# Patient Record
Sex: MALE | Race: WHITE | NOT HISPANIC OR LATINO | Employment: OTHER | URBAN - METROPOLITAN AREA
[De-identification: names, ages, dates, MRNs, and addresses within clinical notes are randomized per-mention and may not be internally consistent; named-entity substitution may affect disease eponyms.]

---

## 2022-06-03 ENCOUNTER — HOSPITAL ENCOUNTER (INPATIENT)
Facility: HOSPITAL | Age: 77
LOS: 4 days | Discharge: NURSING, REHAB OR LONG-TERM CARE | DRG: 563 | End: 2022-06-07
Attending: SURGERY | Admitting: SURGERY
Payer: MEDICARE

## 2022-06-03 ENCOUNTER — APPOINTMENT (INPATIENT)
Dept: RADIOLOGY | Facility: HOSPITAL | Age: 77
DRG: 563 | End: 2022-06-03
Payer: MEDICARE

## 2022-06-03 ENCOUNTER — APPOINTMENT (EMERGENCY)
Dept: CT IMAGING | Facility: HOSPITAL | Age: 77
DRG: 563 | End: 2022-06-03
Payer: MEDICARE

## 2022-06-03 DIAGNOSIS — E87.1 HYPONATREMIA: ICD-10-CM
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
## 2022-06-03 DIAGNOSIS — S42.352A CLOSED DISPLACED COMMINUTED FRACTURE OF SHAFT OF LEFT HUMERUS, INITIAL ENCOUNTER: ICD-10-CM

## 2022-06-03 PROBLEM — R26.2 AMBULATORY DYSFUNCTION: Chronic | Status: ACTIVE | Noted: 2022-06-03

## 2022-06-03 PROBLEM — I47.20 V-TACH: Chronic | Status: ACTIVE | Noted: 2022-06-03

## 2022-06-03 PROBLEM — I10 HYPERTENSION: Chronic | Status: ACTIVE | Noted: 2022-06-03

## 2022-06-03 PROBLEM — S42.302A CLOSED FRACTURE OF SHAFT OF LEFT HUMERUS: Status: ACTIVE | Noted: 2022-06-03

## 2022-06-03 PROBLEM — E78.5 HYPERLIPIDEMIA: Chronic | Status: ACTIVE | Noted: 2022-06-03

## 2022-06-03 PROBLEM — I47.2 V-TACH (HCC): Chronic | Status: ACTIVE | Noted: 2022-06-03

## 2022-06-03 PROBLEM — N18.9 CKD (CHRONIC KIDNEY DISEASE): Chronic | Status: ACTIVE | Noted: 2022-06-03

## 2022-06-03 LAB
ABO GROUP BLD: NORMAL
ALBUMIN SERPL BCP-MCNC: 3.4 G/DL (ref 3.5–5)
ALP SERPL-CCNC: 102 U/L (ref 34–104)
ALT SERPL W P-5'-P-CCNC: 82 U/L (ref 7–52)
ANION GAP SERPL CALCULATED.3IONS-SCNC: 12 MMOL/L (ref 4–13)
APTT PPP: 28 SECONDS (ref 23–37)
AST SERPL W P-5'-P-CCNC: 60 U/L (ref 13–39)
BASOPHILS # BLD AUTO: 0.01 THOUSANDS/ΜL (ref 0–0.1)
BASOPHILS NFR BLD AUTO: 0 % (ref 0–1)
BILIRUB SERPL-MCNC: 1.14 MG/DL (ref 0.2–1)
BLD GP AB SCN SERPL QL: NEGATIVE
BUN SERPL-MCNC: 30 MG/DL (ref 5–25)
CALCIUM ALBUM COR SERPL-MCNC: 9.6 MG/DL (ref 8.3–10.1)
CALCIUM SERPL-MCNC: 9.1 MG/DL (ref 8.4–10.2)
CHLORIDE SERPL-SCNC: 94 MMOL/L (ref 96–108)
CO2 SERPL-SCNC: 31 MMOL/L (ref 21–32)
CREAT SERPL-MCNC: 1.76 MG/DL (ref 0.6–1.3)
EOSINOPHIL # BLD AUTO: 0.03 THOUSAND/ΜL (ref 0–0.61)
EOSINOPHIL NFR BLD AUTO: 0 % (ref 0–6)
ERYTHROCYTE [DISTWIDTH] IN BLOOD BY AUTOMATED COUNT: 15 % (ref 11.6–15.1)
GFR SERPL CREATININE-BSD FRML MDRD: 37 ML/MIN/1.73SQ M
GLUCOSE SERPL-MCNC: 81 MG/DL (ref 65–140)
HCT VFR BLD AUTO: 39.2 % (ref 36.5–49.3)
HGB BLD-MCNC: 12.7 G/DL (ref 12–17)
IMM GRANULOCYTES # BLD AUTO: 0.05 THOUSAND/UL (ref 0–0.2)
IMM GRANULOCYTES NFR BLD AUTO: 1 % (ref 0–2)
INR PPP: 1.14 (ref 0.84–1.19)
LYMPHOCYTES # BLD AUTO: 0.92 THOUSANDS/ΜL (ref 0.6–4.47)
LYMPHOCYTES NFR BLD AUTO: 12 % (ref 14–44)
MCH RBC QN AUTO: 29.8 PG (ref 26.8–34.3)
MCHC RBC AUTO-ENTMCNC: 32.4 G/DL (ref 31.4–37.4)
MCV RBC AUTO: 92 FL (ref 82–98)
MONOCYTES # BLD AUTO: 0.98 THOUSAND/ΜL (ref 0.17–1.22)
MONOCYTES NFR BLD AUTO: 13 % (ref 4–12)
NEUTROPHILS # BLD AUTO: 5.49 THOUSANDS/ΜL (ref 1.85–7.62)
NEUTS SEG NFR BLD AUTO: 74 % (ref 43–75)
NRBC BLD AUTO-RTO: 0 /100 WBCS
PLATELET # BLD AUTO: 215 THOUSANDS/UL (ref 149–390)
PMV BLD AUTO: 10.8 FL (ref 8.9–12.7)
POTASSIUM SERPL-SCNC: 4.1 MMOL/L (ref 3.5–5.3)
PROT SERPL-MCNC: 7.1 G/DL (ref 6.4–8.4)
PROTHROMBIN TIME: 14.6 SECONDS (ref 11.6–14.5)
RBC # BLD AUTO: 4.26 MILLION/UL (ref 3.88–5.62)
RH BLD: POSITIVE
SODIUM SERPL-SCNC: 137 MMOL/L (ref 135–147)
SPECIMEN EXPIRATION DATE: NORMAL
WBC # BLD AUTO: 7.48 THOUSAND/UL (ref 4.31–10.16)

## 2022-06-03 PROCEDURE — 93005 ELECTROCARDIOGRAM TRACING: CPT

## 2022-06-03 PROCEDURE — 73200 CT UPPER EXTREMITY W/O DYE: CPT

## 2022-06-03 PROCEDURE — 85610 PROTHROMBIN TIME: CPT | Performed by: STUDENT IN AN ORGANIZED HEALTH CARE EDUCATION/TRAINING PROGRAM

## 2022-06-03 PROCEDURE — 93308 TTE F-UP OR LMTD: CPT | Performed by: SURGERY

## 2022-06-03 PROCEDURE — 85730 THROMBOPLASTIN TIME PARTIAL: CPT | Performed by: STUDENT IN AN ORGANIZED HEALTH CARE EDUCATION/TRAINING PROGRAM

## 2022-06-03 PROCEDURE — 70450 CT HEAD/BRAIN W/O DYE: CPT

## 2022-06-03 PROCEDURE — 86901 BLOOD TYPING SEROLOGIC RH(D): CPT | Performed by: SURGERY

## 2022-06-03 PROCEDURE — 84295 ASSAY OF SERUM SODIUM: CPT

## 2022-06-03 PROCEDURE — 82803 BLOOD GASES ANY COMBINATION: CPT

## 2022-06-03 PROCEDURE — 12001 RPR S/N/AX/GEN/TRNK 2.5CM/<: CPT | Performed by: SURGERY

## 2022-06-03 PROCEDURE — NC001 PR NO CHARGE: Performed by: SURGERY

## 2022-06-03 PROCEDURE — 84132 ASSAY OF SERUM POTASSIUM: CPT

## 2022-06-03 PROCEDURE — 80053 COMPREHEN METABOLIC PANEL: CPT | Performed by: STUDENT IN AN ORGANIZED HEALTH CARE EDUCATION/TRAINING PROGRAM

## 2022-06-03 PROCEDURE — 99285 EMERGENCY DEPT VISIT HI MDM: CPT

## 2022-06-03 PROCEDURE — 86900 BLOOD TYPING SEROLOGIC ABO: CPT | Performed by: SURGERY

## 2022-06-03 PROCEDURE — 72125 CT NECK SPINE W/O DYE: CPT

## 2022-06-03 PROCEDURE — 85014 HEMATOCRIT: CPT

## 2022-06-03 PROCEDURE — 99223 1ST HOSP IP/OBS HIGH 75: CPT | Performed by: SURGERY

## 2022-06-03 PROCEDURE — 1123F ACP DISCUSS/DSCN MKR DOCD: CPT | Performed by: EMERGENCY MEDICINE

## 2022-06-03 PROCEDURE — 0HQ0XZZ REPAIR SCALP SKIN, EXTERNAL APPROACH: ICD-10-PCS | Performed by: SURGERY

## 2022-06-03 PROCEDURE — 76705 ECHO EXAM OF ABDOMEN: CPT | Performed by: SURGERY

## 2022-06-03 PROCEDURE — 76604 US EXAM CHEST: CPT | Performed by: SURGERY

## 2022-06-03 PROCEDURE — 86850 RBC ANTIBODY SCREEN: CPT | Performed by: SURGERY

## 2022-06-03 PROCEDURE — NC001 PR NO CHARGE: Performed by: EMERGENCY MEDICINE

## 2022-06-03 PROCEDURE — 2W38X1Z IMMOBILIZATION OF RIGHT UPPER EXTREMITY USING SPLINT: ICD-10-PCS | Performed by: SURGERY

## 2022-06-03 PROCEDURE — 82330 ASSAY OF CALCIUM: CPT

## 2022-06-03 PROCEDURE — 73060 X-RAY EXAM OF HUMERUS: CPT

## 2022-06-03 PROCEDURE — 82947 ASSAY GLUCOSE BLOOD QUANT: CPT

## 2022-06-03 PROCEDURE — 85025 COMPLETE CBC W/AUTO DIFF WBC: CPT | Performed by: STUDENT IN AN ORGANIZED HEALTH CARE EDUCATION/TRAINING PROGRAM

## 2022-06-03 PROCEDURE — 36415 COLL VENOUS BLD VENIPUNCTURE: CPT | Performed by: STUDENT IN AN ORGANIZED HEALTH CARE EDUCATION/TRAINING PROGRAM

## 2022-06-03 RX ORDER — TORSEMIDE 20 MG/1
40 TABLET ORAL 2 TIMES DAILY
COMMUNITY

## 2022-06-03 RX ORDER — ESCITALOPRAM OXALATE 20 MG/1
20 TABLET ORAL DAILY
Status: DISCONTINUED | OUTPATIENT
Start: 2022-06-03 | End: 2022-06-07 | Stop reason: HOSPADM

## 2022-06-03 RX ORDER — FENTANYL CITRATE 50 UG/ML
50 INJECTION, SOLUTION INTRAMUSCULAR; INTRAVENOUS ONCE
Status: COMPLETED | OUTPATIENT
Start: 2022-06-03 | End: 2022-06-03

## 2022-06-03 RX ORDER — EFINACONAZOLE 100 MG/ML
1 SOLUTION TOPICAL DAILY
COMMUNITY

## 2022-06-03 RX ORDER — PROPOFOL 10 MG/ML
10 INJECTION, EMULSION INTRAVENOUS ONCE
Status: COMPLETED | OUTPATIENT
Start: 2022-06-03 | End: 2022-06-03

## 2022-06-03 RX ORDER — DOCUSATE SODIUM 100 MG/1
100 CAPSULE, LIQUID FILLED ORAL 2 TIMES DAILY
Status: DISCONTINUED | OUTPATIENT
Start: 2022-06-03 | End: 2022-06-07 | Stop reason: HOSPADM

## 2022-06-03 RX ORDER — ATORVASTATIN CALCIUM 20 MG/1
20 TABLET, FILM COATED ORAL
Status: DISCONTINUED | OUTPATIENT
Start: 2022-06-03 | End: 2022-06-07 | Stop reason: HOSPADM

## 2022-06-03 RX ORDER — MEXILETINE HYDROCHLORIDE 150 MG/1
150 CAPSULE ORAL 3 TIMES DAILY
Status: DISCONTINUED | OUTPATIENT
Start: 2022-06-03 | End: 2022-06-07 | Stop reason: HOSPADM

## 2022-06-03 RX ORDER — AMIODARONE HYDROCHLORIDE 200 MG/1
200 TABLET ORAL 2 TIMES DAILY
Status: DISCONTINUED | OUTPATIENT
Start: 2022-06-03 | End: 2022-06-07 | Stop reason: HOSPADM

## 2022-06-03 RX ORDER — CARVEDILOL 12.5 MG/1
18.75 TABLET ORAL EVERY 12 HOURS SCHEDULED
COMMUNITY

## 2022-06-03 RX ORDER — ATORVASTATIN CALCIUM 20 MG/1
20 TABLET, FILM COATED ORAL
COMMUNITY

## 2022-06-03 RX ORDER — FENTANYL CITRATE 50 UG/ML
INJECTION, SOLUTION INTRAMUSCULAR; INTRAVENOUS
Status: COMPLETED
Start: 2022-06-03 | End: 2022-06-03

## 2022-06-03 RX ORDER — HYDROMORPHONE HCL IN WATER/PF 6 MG/30 ML
0.2 PATIENT CONTROLLED ANALGESIA SYRINGE INTRAVENOUS
Status: DISCONTINUED | OUTPATIENT
Start: 2022-06-03 | End: 2022-06-07 | Stop reason: HOSPADM

## 2022-06-03 RX ORDER — ESCITALOPRAM OXALATE 20 MG/1
20 TABLET ORAL DAILY
COMMUNITY
End: 2022-06-09 | Stop reason: ALTCHOICE

## 2022-06-03 RX ORDER — SACUBITRIL AND VALSARTAN 24; 26 MG/1; MG/1
1 TABLET, FILM COATED ORAL EVERY 12 HOURS SCHEDULED
COMMUNITY

## 2022-06-03 RX ORDER — PROPOFOL 10 MG/ML
100 INJECTION, EMULSION INTRAVENOUS CONTINUOUS
Status: DISCONTINUED | OUTPATIENT
Start: 2022-06-03 | End: 2022-06-03

## 2022-06-03 RX ORDER — ENOXAPARIN SODIUM 100 MG/ML
30 INJECTION SUBCUTANEOUS EVERY 12 HOURS
Status: DISCONTINUED | OUTPATIENT
Start: 2022-06-03 | End: 2022-06-03

## 2022-06-03 RX ORDER — AMIODARONE HYDROCHLORIDE 200 MG/1
200 TABLET ORAL EVERY 12 HOURS SCHEDULED
COMMUNITY

## 2022-06-03 RX ORDER — TORSEMIDE 20 MG/1
40 TABLET ORAL 2 TIMES DAILY
Status: DISCONTINUED | OUTPATIENT
Start: 2022-06-03 | End: 2022-06-03

## 2022-06-03 RX ORDER — MEXILETINE HYDROCHLORIDE 150 MG/1
150 CAPSULE ORAL EVERY 8 HOURS SCHEDULED
COMMUNITY

## 2022-06-03 RX ORDER — OXYCODONE HYDROCHLORIDE 5 MG/1
5 TABLET ORAL EVERY 4 HOURS PRN
Status: DISCONTINUED | OUTPATIENT
Start: 2022-06-03 | End: 2022-06-07 | Stop reason: HOSPADM

## 2022-06-03 RX ORDER — OXYCODONE HYDROCHLORIDE 5 MG/1
2.5 TABLET ORAL EVERY 4 HOURS PRN
Status: DISCONTINUED | OUTPATIENT
Start: 2022-06-03 | End: 2022-06-07 | Stop reason: HOSPADM

## 2022-06-03 RX ORDER — FENTANYL CITRATE 50 UG/ML
1 INJECTION, SOLUTION INTRAMUSCULAR; INTRAVENOUS ONCE
Status: COMPLETED | OUTPATIENT
Start: 2022-06-03 | End: 2022-06-03

## 2022-06-03 RX ORDER — HEPARIN SODIUM 5000 [USP'U]/ML
7500 INJECTION, SOLUTION INTRAVENOUS; SUBCUTANEOUS EVERY 8 HOURS SCHEDULED
Status: DISCONTINUED | OUTPATIENT
Start: 2022-06-03 | End: 2022-06-04

## 2022-06-03 RX ORDER — PROPOFOL 10 MG/ML
40 INJECTION, EMULSION INTRAVENOUS ONCE
Status: COMPLETED | OUTPATIENT
Start: 2022-06-03 | End: 2022-06-03

## 2022-06-03 RX ORDER — SENNOSIDES 8.6 MG
2 TABLET ORAL DAILY
Status: DISCONTINUED | OUTPATIENT
Start: 2022-06-04 | End: 2022-06-07 | Stop reason: HOSPADM

## 2022-06-03 RX ADMIN — FENTANYL CITRATE 50 MCG: 50 INJECTION INTRAMUSCULAR; INTRAVENOUS at 18:31

## 2022-06-03 RX ADMIN — PROPOFOL 10 MG: 10 INJECTION, EMULSION INTRAVENOUS at 18:26

## 2022-06-03 RX ADMIN — PROPOFOL 50 MG: 10 INJECTION, EMULSION INTRAVENOUS at 18:18

## 2022-06-03 RX ADMIN — MEXILETINE HYDROCHLORIDE 150 MG: 150 CAPSULE ORAL at 21:20

## 2022-06-03 RX ADMIN — DOCUSATE SODIUM 100 MG: 100 CAPSULE, LIQUID FILLED ORAL at 21:19

## 2022-06-03 RX ADMIN — FENTANYL CITRATE 50 MCG: 50 INJECTION, SOLUTION INTRAMUSCULAR; INTRAVENOUS at 18:31

## 2022-06-03 RX ADMIN — SACUBITRIL AND VALSARTAN 1 TABLET: 24; 26 TABLET, FILM COATED ORAL at 21:20

## 2022-06-03 RX ADMIN — ESCITALOPRAM OXALATE 20 MG: 20 TABLET ORAL at 22:30

## 2022-06-03 RX ADMIN — HEPARIN SODIUM 7500 UNITS: 5000 INJECTION INTRAVENOUS; SUBCUTANEOUS at 21:18

## 2022-06-03 RX ADMIN — ATORVASTATIN CALCIUM 20 MG: 20 TABLET, FILM COATED ORAL at 21:19

## 2022-06-03 RX ADMIN — PROPOFOL 10 MG: 10 INJECTION, EMULSION INTRAVENOUS at 18:23

## 2022-06-03 RX ADMIN — AMIODARONE HYDROCHLORIDE 200 MG: 200 TABLET ORAL at 21:20

## 2022-06-03 NOTE — ASSESSMENT & PLAN NOTE
- Chronic history of hypertension   - Continue current medication regimen with Coreg  Diuretic on hold due to elevation of creatinine above baseline in setting of chronic kidney disease   - Adjust management as indicated  - Outpatient follow-up per routine

## 2022-06-03 NOTE — ASSESSMENT & PLAN NOTE
Lab Results   Component Value Date    EGFR 37 06/03/2022    CREATININE 1 76 (H) 06/03/2022     - Patient with baseline history of CKD stage 3 with baseline creatinine appearing to be between 1 4 & 1 5   - Creatinine on presentation mildly elevated above baseline to 1 76   - Avoid nephrotoxic medications and hypotension   - Monitor volume status with daily weights and accurate I/Os  - Repeat BMP on 06/04/2022

## 2022-06-03 NOTE — ASSESSMENT & PLAN NOTE
- Patient with chronic history of V-tach status post ablation at [facility] as well as history of ischemic cardiomyopathy with ICD in place   - Patient did have episode of V-tach requiring defibrillation on 05/13/2022 per outpatient notes  - Consider inpatient cardiology consultation if patient requires operative intervention for perioperative evaluation and risk assessment   - Continue home medication regimen including Amiodarone and Mexiletine   - No evidence that the patient's fall was cardiac related

## 2022-06-03 NOTE — ASSESSMENT & PLAN NOTE
- Chronic history of hyperlipidemia   - Continue medication regimen   - Outpatient follow-up per routine

## 2022-06-03 NOTE — H&P
[facility]  [facility]- Kimberley Small 1945, 77 y o  male MRN: [id number]  Unit/Bed#: ED 13 Encounter: [id number]  Primary Care Provider: Tito Post DO   Date and time admitted to hospital: 6/3/2022  4:36 PM    Fall  Assessment & Plan  - Status post fall with the below noted injuries  - Fall precautions  - Geriatric Medicine consultation for evaluation, medication review and recommendations   - PT and OT evaluation and treatment as indicated  - Case Management consultation for disposition planning  Ambulatory dysfunction  Assessment & Plan  - Patient with chronic ambulatory dysfunction, status post fall today  - Maintain fall precautions  - Up with assistance only  - Await PT and OT evaluation recommendations  * Closed fracture of shaft of left humerus  Assessment & Plan  - Acute left humeral shaft fracture, present on admission   - Appreciate Orthopedic surgery evaluation and recommendations  - Maintain NON-weightbearing status on the left upper extremity in splint   - Monitor left upper extremity neurovascular exam   - Continue multimodal analgesic regimen   - Continue DVT prophylaxis  - PT and OT evaluation and treatment as indicated  - Outpatient follow up with Orthopedic surgery for re-evaluation  CKD (chronic kidney disease)  Assessment & Plan  Lab Results   Component Value Date    EGFR 37 06/03/2022    CREATININE 1 76 (H) 06/03/2022     - Patient with baseline history of CKD stage 3 with baseline creatinine appearing to be between 1 4 & 1 5   - Creatinine on presentation mildly elevated above baseline to 1 76   - Avoid nephrotoxic medications and hypotension   - Monitor volume status with daily weights and accurate I/Os  - Repeat BMP on 06/04/2022      V-tach [facility])  Assessment & Plan  - Patient with chronic history of V-tach status post ablation at [facility] as well as history of ischemic cardiomyopathy with ICD in place   - Patient did have episode of V-tach requiring defibrillation on 05/13/2022 per outpatient notes  - Consider inpatient cardiology consultation if patient requires operative intervention for perioperative evaluation and risk assessment   - Continue home medication regimen including Amiodarone and Mexiletine   - No evidence that the patient's fall was cardiac related  Hypertension  Assessment & Plan  - Chronic history of hypertension   - Continue current medication regimen with Coreg  Diuretic on hold due to elevation of creatinine above baseline in setting of chronic kidney disease   - Adjust management as indicated  - Outpatient follow-up per routine  Hyperlipidemia  Assessment & Plan  - Chronic history of hyperlipidemia   - Continue medication regimen   - Outpatient follow-up per routine  Disposition:  Admit to the trauma service while awaiting Orthopedic surgery consultation as well as therapy evaluation recommendations in setting of ambulatory dysfunction  Patient likely will require post acute care facility placement for rehab  Case Management consulted  Trauma Alert: Level B   Model of Arrival: Ambulance    Trauma Team: Attending Dr Suzette Dewey, Fellow Dr Pito Vergara and AURELIA Shaikh PA-C  Consultants:     Orthopedics: routine consult; Epic consult order placed; History of Present Illness     Chief Complaint:  My arm hurts    Mechanism:Fall     HPI:    Agatha Quesada is a 77 y o  male who presents with right upper arm pain  He experienced a mechanical fall down a step on to driveway  He did strike the back of his head, but denied losing consciousness  Review of Systems   Constitutional: Positive for activity change (Decreased activity secondary to fall and right arm pain)  Negative for appetite change, chills, fatigue and fever  HENT: Negative  Negative for ear pain, facial swelling, nosebleeds and voice change  Eyes: Negative  Negative for photophobia, pain, redness and visual disturbance  Respiratory: Negative  Negative for cough, chest tightness, shortness of breath and wheezing  Cardiovascular: Negative  Negative for chest pain, palpitations and leg swelling  Gastrointestinal: Negative  Negative for abdominal distention, abdominal pain, nausea and vomiting  Endocrine: Negative  Genitourinary: Negative  Negative for dysuria, flank pain, frequency and hematuria  Musculoskeletal: Positive for arthralgias (Right upper arm pain)  Negative for back pain, myalgias and neck pain  Skin: Positive for color change (Chronic skin color change to right lower legs) and wound (Posterior scalp wound and right upper arm wound  Chronic wounds on the bilateral lower legs)  Negative for pallor and rash  Allergic/Immunologic: Negative  Neurological: Positive for headaches  Negative for dizziness, syncope, weakness, light-headedness and numbness  Hematological: Negative  Psychiatric/Behavioral: Negative  Negative for agitation, confusion, self-injury and sleep disturbance  The patient is not nervous/anxious  12-point, complete review of systems was reviewed and negative except as stated above       Historical Information     Past Medical History:   Diagnosis Date    Bilateral hearing loss     CHF (congestive heart failure) (HCC)     Depression     Frequent falls     Hypertension     ICD (implantable cardioverter-defibrillator) in place     Ischemic cardiomyopathy     Morbid obesity ([address] )     Sleep apnea     Ventricular tachycardia ([facility] [address] )      Past Surgical History:   Procedure Laterality Date    CARDIAC ELECTROPHYSIOLOGY STUDY AND ABLATION      Ventricular tachycardia ablation at [address] by Dr Alonso Burrell          Social History     Tobacco Use    Smoking status: Never Smoker    Smokeless tobacco: Never Used   Vaping Use    Vaping Use: Never used   Substance Use Topics    Alcohol use: Not Currently    Drug use: Not Currently     Immunization History   Administered Date(s) Administered    COVID-19 MODERNA VACC 0 5 ML IM 03/12/2021, 04/09/2021, 12/20/2021     Last Tetanus:  Up-to-date per patient  Family History: Non-contributory    1  Before the illness or injury that brought you to the Emergency, did you need someone to help you on a regular basis? 1=Yes   2  Since the illness or injury that brought you to the Emergency, have you needed more help than usual to take care of yourself? 1=Yes   3  Have you been hospitalized for one or more nights during the past 6 months (excluding a stay in the Emergency Department)? 0=No   4  In general, do you see well? 0=Yes   5  In general, do you have serious problems with your memory? 0=No   6  Do you take more than three different medications everyday? 1=Yes   TOTAL   3     Did you order a geriatric consult if the score was 2 or greater?: yes     Meds/Allergies   all current active meds have been reviewed, current meds:   Current Facility-Administered Medications   Medication Dose Route Frequency    amiodarone tablet 200 mg  200 mg Oral BID    atorvastatin (LIPITOR) tablet 20 mg  20 mg Oral Daily With Dinner    [START ON 6/4/2022] carvedilol (COREG) tablet 18 75 mg  18 75 mg Oral BID With Meals    docusate sodium (COLACE) capsule 100 mg  100 mg Oral BID    [START ON 6/4/2022] escitalopram (LEXAPRO) tablet 20 mg  20 mg Oral Daily    heparin (porcine) subcutaneous injection 7,500 Units  7,500 Units Subcutaneous Q[address]H [address]    mexiletine (MEXITIL) capsule 150 mg  150 mg Oral TID    sacubitril-valsartan (ENTRESTO) 24-26 MG per tablet 1 tablet  1 tablet Oral BID    [START ON 6/4/2022] senna (SENOKOT) tablet 17 2 mg  2 tablet Oral Daily    and PTA meds:   Prior to Admission Medications   Prescriptions Last Dose Informant Patient Reported? Taking?    Efinaconazole ([place]) 10 % SOLN   Yes Yes   Sig: Apply 1 application topically in the morning   amiodarone 200 mg tablet   Yes Yes Sig: Take 200 mg by mouth 2 (two) times a day   atorvastatin (LIPITOR) 20 mg tablet   Yes Yes   Sig: Take 20 mg by mouth daily with dinner   carvedilol (COREG) 12 5 mg tablet   Yes Yes   Sig: Take 18 75 mg by mouth 2 (two) times a day with meals   escitalopram (LEXAPRO) 20 mg tablet   Yes Yes   Sig: Take 20 mg by mouth daily   mexiletine (MEXITIL) 150 mg capsule   Yes Yes   Sig: Take 150 mg by mouth 3 (three) times a day   sacubitril-valsartan (Entresto) 24-26 MG TABS   Yes Yes   Sig: Take 1 tablet by mouth 2 (two) times a day   torsemide (DEMADEX) 20 mg tablet   Yes Yes   Sig: Take 40 mg by mouth 2 (two) times a day      Facility-Administered Medications: None      No Known Allergies    Objective   Initial Vitals:   Temperature: 98 9 °F (37 2 °C) (06/03/22 1635)  Pulse: 68 (06/03/22 1635)  Respirations: 18 (06/03/22 1635)  Blood Pressure: 121/66 (06/03/22 1635)    Primary Survey:   Airway:        Status: patent;        Pre-hospital Interventions: none        Hospital Interventions: none  Breathing:        Pre-hospital Interventions: none       Effort: normal       Right breath sounds: normal       Left breath sounds: normal  Circulation:        Rhythm: regular       Rate: regular   Right Pulses Left Pulses    R radial: 2+  R femoral: 2+  R pedal: 2+  R carotid: 2+  R popliteal: 2+ L radial: 2+  L femoral: 2+  L pedal: 2+  L carotid: 2+  L popliteal: 2+   Disability:        GCS: Eye: 4; Verbal: 5 Motor: 6 Total: 15       Right Pupil: round;  reactive         Left Pupil:  round;  reactive      R Motor Strength L Motor Strength    R : 5/5  R dorsiflex: 5/5  R plantarflex: 5/5 L : 5/5  L dorsiflex: 5/5  L plantarflex: 5/5        Sensory:  No sensory deficit  Exposure:       Completed: Yes      Secondary Survey:  Physical Exam  Vitals and nursing note reviewed  Exam conducted with a chaperone present  Constitutional:       General: He is awake  He is not in acute distress  Appearance: Normal appearance  He is normal weight  He is not ill-appearing, toxic-appearing or diaphoretic  Interventions: He is not intubated  Cervical collar in place  HENT:      Head: Normocephalic  Contusion (Posterior scalp contusion) and laceration (Posterior scalp laceration) present  No abrasion  Jaw: There is normal jaw occlusion  Right Ear: Hearing and external ear normal  No swelling or tenderness  Left Ear: Hearing and external ear normal  No swelling or tenderness  Nose: Nose normal  No nasal deformity, septal deviation, signs of injury, laceration or nasal tenderness  Mouth/Throat:      Mouth: Mucous membranes are moist       Pharynx: Oropharynx is clear  Eyes:      General: Lids are normal  Vision grossly intact  Extraocular Movements: Extraocular movements intact  Conjunctiva/sclera: Conjunctivae normal       Pupils: Pupils are equal, round, and reactive to light  Cardiovascular:      Rate and Rhythm: Normal rate and regular rhythm  Pulses:           Carotid pulses are 2+ on the right side and 2+ on the left side  Radial pulses are 2+ on the right side and 2+ on the left side  Femoral pulses are 2+ on the right side and 2+ on the left side  Dorsalis pedis pulses are 2+ on the right side and 2+ on the left side  Heart sounds: Normal heart sounds  No murmur heard  No friction rub  No gallop  Pulmonary:      Effort: Pulmonary effort is normal  No tachypnea, bradypnea, accessory muscle usage, prolonged expiration, respiratory distress or retractions  He is not intubated  Breath sounds: Normal breath sounds and air entry  No stridor or decreased air movement  No decreased breath sounds, wheezing, rhonchi or rales  Chest:      Chest wall: No lacerations, deformity, swelling, tenderness or crepitus  Abdominal:      General: Abdomen is flat  Bowel sounds are normal  There is no distension  Palpations: Abdomen is soft  Tenderness:  There is no abdominal tenderness  There is no guarding or rebound  Musculoskeletal:         General: Swelling (Right upper arm swelling), tenderness (Right upper arm tenderness) and deformity (Right upper arm deformity) present  Right upper arm: Swelling, deformity, laceration (Superficial skin tear) and tenderness present  Cervical back: Neck supple  No swelling, deformity, signs of trauma, lacerations or tenderness  No spinous process tenderness or muscular tenderness  Thoracic back: No swelling, deformity, signs of trauma, lacerations or tenderness  Lumbar back: No swelling, deformity, signs of trauma, lacerations or tenderness  Right lower leg: Edema present  Left lower leg: Edema present  Comments: No midline cervical, thoracic or lumbar spine tenderness, step-offs or deformities  No paraspinal muscular tenderness in the neck or back  Normal range of motion in left upper and bilateral lower extremities without pain, tenderness or deformity  There was a significant deformity with swelling of the right upper extremity, associated tenderness, and very limited range of motion in the shoulder and elbow with intact neurovascular exam and pulses distally  Skin:     General: Skin is warm and dry  Capillary Refill: Capillary refill takes less than 2 seconds  Coloration: Skin is not jaundiced or pale  Findings: Erythema (Chronic erythematous changes consistent with venous stasis disease to the bilateral lower legs ), laceration (Posterior scalp laceration without active bleeding and mild tenderness) and wound (Posterior scalp wound and right upper arm skin tear) present  No bruising, ecchymosis, lesion or rash  Neurological:      General: No focal deficit present  Mental Status: He is alert and oriented to person, place, and time  Mental status is at baseline  GCS: GCS eye subscore is 4  GCS verbal subscore is 5  GCS motor subscore is 6        Sensory: Sensation is intact  No sensory deficit  Motor: Motor function is intact  No weakness  Psychiatric:         Mood and Affect: Mood normal          Behavior: Behavior is cooperative  Invasive Devices  Report    Peripheral Intravenous Line  Duration           Peripheral IV 06/03/22 Left Antecubital <1 day              Lab Results:   Results: I have personally reviewed all pertinent laboratory/tests results, BMP/CMP:   Lab Results   Component Value Date    SODIUM 137 06/03/2022    K 4 1 06/03/2022    CL 94 (L) 06/03/2022    CO2 31 06/03/2022    BUN 30 (H) 06/03/2022    CREATININE 1 76 (H) 06/03/2022    CALCIUM 9 1 06/03/2022    AST 60 (H) 06/03/2022    ALT 82 (H) 06/03/2022    ALKPHOS 102 06/03/2022    EGFR 37 06/03/2022   , CBC:   Lab Results   Component Value Date    WBC 7 48 06/03/2022    HGB 12 7 06/03/2022    HCT 39 2 06/03/2022    MCV 92 06/03/2022     06/03/2022    MCH 29 8 06/03/2022    MCHC 32 4 06/03/2022    RDW 15 0 06/03/2022    MPV 10 8 06/03/2022    NRBC 0 06/03/2022   , Coagulation:   Lab Results   Component Value Date    INR 1 14 06/03/2022    and ISTAT: No components found for: VBG    Imaging Results: I have personally reviewed pertinent reports  and I have personally reviewed pertinent films in PACS  Chest Xray(s): negative for acute findings   FAST exam(s): negative for acute findings   CT Scan(s): positive for acute findings: Displaced right humerus fracture   Additional Xray(s): positive for acute findings: Displaced right humerus fracture     Other Studies: N/A    Code Status: Level 1 - Full Code  Advance Directive and Living Will:      Power of :    POLST:    I have spent 45 minutes with Patient  today in which greater than 50% of this time was spent in counseling/coordination of care regarding Diagnostic results, Prognosis, Intructions for management and Impressions

## 2022-06-03 NOTE — ED PROCEDURE NOTE
Procedure  Pre-Procedural Sedation  Performed by: Ruy Montenegro DO  Authorized by: Ruy Montenegro DO     Consent:     Consent obtained:  Written    Consent given by:  Patient and spouse    Risks discussed: Allergic reaction, dysrhythmia, inadequate sedation, nausea, vomiting, respiratory compromise necessitating ventilatory assistance and intubation, prolonged hypoxia resulting in organ damage and prolonged sedation necessitating reversal    Alternatives discussed:  Anxiolysis, analgesia without sedation and regional anesthesia  [place] protocol:     Procedure explained and questions answered to patient or proxy's satisfaction: yes      Relevant documents present and verified: yes      Test results available and properly labeled: yes      Radiology Images displayed and confirmed    If images not available, report reviewed: yes      Site/side marked: yes      Immediately prior to procedure a time out was called: yes      Patient identity confirmation method:  Verbally with patient and arm band  Indications:     Sedation purpose:  Fracture reduction    Procedure necessitating sedation performed by:  Different physician (Munira Hughes)    Intended level of sedation:  Moderate (conscious sedation)  Pre-sedation assessment:     Time since last food or drink:  >24 hours ago    ASA classification: class 2 - patient with mild systemic disease      Neck mobility: reduced      Mouth opening:  3 or more finger widths    Thyromental distance:  2 finger widths    Mallampati score:  II - soft palate, uvula, fauces visible    Pre-sedation assessments completed and reviewed: airway patency, cardiovascular function, hydration status, mental status, nausea/vomiting, pain level, respiratory function and temperature      Procedural Sedation    Date/Time: 6/3/2022 6:18 PM  Performed by: Ruy Montenegro DO  Authorized by: Ruy Montenegro DO     Immediate pre-procedure details:     Reassessment: Patient reassessed immediately prior to procedure      Reviewed: vital signs, relevant labs/tests and NPO status      Verified: bag valve mask available, emergency equipment available, intubation equipment available, IV patency confirmed, oxygen available, reversal medications available and suction available    Procedure details (see MAR for exact dosages):     Sedation start time:  6/3/2022 6:18 PM    Preoxygenation:  Nasal cannula    Sedation:  Propofol    Analgesia:  Fentanyl    Intra-procedure monitoring:  Blood pressure monitoring, cardiac monitor, continuous capnometry, continuous pulse oximetry, frequent LOC assessments and frequent vital sign checks    Intra-procedure events: none      Intra-procedure management:  Supplemental oxygen    Sedation end time:  6/3/2022 6:36 PM    Total sedation time (minutes):  18  Post-procedure details:     Attendance: Constant attendance by certified staff until patient recovered      Recovery: Patient returned to pre-procedure baseline      Post-sedation assessments completed and reviewed: airway patency, cardiovascular function, hydration status, mental status, nausea/vomiting, pain level and respiratory function      Patient tolerance: Tolerated well, no immediate complications  Splint application    Date/Time: 6/3/2022 6:37 PM  Performed by: Perlita Dias DO  Authorized by: Perlita Dias DO   Universal Protocol:  Procedure performed by: (Dr David Martinez, Dr Ariadne Shen)  Consent: Written consent obtained  Risks and benefits: risks, benefits and alternatives were discussed  Consent given by: parent  Time out: Immediately prior to procedure a "time out" was called to verify the correct patient, procedure, equipment, support staff and site/side marked as required    Timeout called at: 6/3/2022 6:18 PM   Patient understanding: patient states understanding of the procedure being performed  Patient consent: the patient's understanding of the procedure matches consent given  Procedure consent: procedure consent matches procedure scheduled  Relevant documents: relevant documents present and verified  Test results: test results available and properly labeled  Site marked: the operative site was marked  Radiology Images displayed and confirmed   If images not available, report reviewed: imaging studies available  Patient identity confirmed: arm band and verbally with patient      Pre-procedure details:     Sensation:  Normal  Procedure details:     Laterality:  Right    Location:  Arm    Arm:  R upper arm    Splint type:  Long arm and sugar tong    Supplies:  Cotton padding, Ortho-Glass, elastic bandage and sling                     Moshe Akins,   06/03/22 DO Ryan  06/03/22 1836

## 2022-06-03 NOTE — PROCEDURES
Laceration repair    Date/Time: 6/3/2022 6:53 PM  Performed by: Didi Jara MD  Authorized by: Didi Jara MD   Consent: Verbal consent obtained  Risks and benefits: risks, benefits and alternatives were discussed  Consent given by: patient  Patient understanding: patient states understanding of the procedure being performed  Patient consent: the patient's understanding of the procedure matches consent given  Procedure consent: procedure consent matches procedure scheduled  Relevant documents: relevant documents present and verified  Patient identity confirmed: verbally with patient  Time out: Immediately prior to procedure a "time out" was called to verify the correct patient, procedure, equipment, support staff and site/side marked as required    Body area: head/neck  Location details: scalp  Laceration length: 1 5 cm  Foreign bodies: no foreign bodies  Tendon involvement: none  Nerve involvement: none  Vascular damage: no    Sedation:  Patient sedated: no      Wound Dehiscence:  Superficial Wound Dehiscence: simple closure      Procedure Details:  Irrigation solution: tap water  Debridement: none  Degree of undermining: none  Skin closure: staples  Number of sutures: 3  Approximation: close  Approximation difficulty: simple  Patient tolerance: patient tolerated the procedure well with no immediate complications

## 2022-06-03 NOTE — ASSESSMENT & PLAN NOTE
- Acute left humeral shaft fracture, present on admission   - Appreciate Orthopedic surgery evaluation and recommendations  - Maintain NON-weightbearing status on the left upper extremity in splint   - Monitor left upper extremity neurovascular exam   - Continue multimodal analgesic regimen   - Continue DVT prophylaxis  - PT and OT evaluation and treatment as indicated  - Outpatient follow up with Orthopedic surgery for re-evaluation

## 2022-06-03 NOTE — CASE MANAGEMENT
CM responded to trauma alert  Patient transported to trauma bay by [employer]  Patient responsive to medical team questions and instructions  Patient reported that he would like wife Piero De La O notified but he could not remember the number  Cm spent some time trying to locate the number and name  Cm was able to find Indira VenturaWyfxoheul-[phone]  Cm called number twice and left voice message  Cm provided contact number to Trauma AP  No current identified CM needs  CM will follow and update screening, assessment, and discharge planning as appropriate

## 2022-06-03 NOTE — ED PROVIDER NOTES
Emergency Department Airway Evaluation and Management Form    History  Obtained from: EMS/Patient  Patient has no allergy information on record  No chief complaint on file  HPI    43-year-old male brought by EMS after a fall onto asphalt with head strike, no LOC  Complaining of right arm pain  Takes aspirin  Awake and alert on arrival   No acute airway intervention needed  Further management per trauma team     No past medical history on file  No past surgical history on file  No family history on file  I have reviewed and agree with the history as documented      Review of Systems    Physical Exam  /66   Pulse 68   Temp 98 9 °F (37 2 °C)   Resp 18   Wt 117 kg (257 lb 11 5 oz)   SpO2 92%     Physical Exam    ED Medications  Medications   fentanyl citrate (PF) (FOR EMS ONLY) 100 mcg/2 mL injection 100 mcg (has no administration in time range)       Intubation  Procedures    Notes      Final Diagnosis  Final diagnoses:   None       ED Provider  Electronically Signed by     Francisco Gould MD  06/03/22 8297

## 2022-06-03 NOTE — ASSESSMENT & PLAN NOTE
- Patient with chronic ambulatory dysfunction, status post fall today  - Maintain fall precautions  - Up with assistance only  - Await PT and OT evaluation recommendations

## 2022-06-03 NOTE — PROCEDURES
POC FAST US    Date/Time: 6/3/2022 4:54 PM  Performed by: Edward Edwards PA-C  Authorized by:  Edward Edwards PA-C     Patient location:  Trauma  Other Assisting Provider: No    Procedure details:     Exam Type:  Diagnostic    Indications: blunt abdominal trauma and blunt chest trauma      Assess for:  Intra-abdominal fluid, pericardial effusion and pneumothorax    Technique: extended FAST      Views obtained:  Heart - Pericardial sac, LUQ - Splenorenal space, Suprapubic - Pouch of Zachery, RUQ - Rob's Pouch, Left thorax and Right thorax    Image quality: diagnostic      Image availability:  Images available in PACS and video obtained  FAST Findings:     RUQ (Hepatorenal) free fluid: absent      LUQ (Splenorenal) free fluid: absent      Suprapubic free fluid: absent      Cardiac wall motion: identified      Pericardial effusion: absent    extended FAST (Pulmonary) findings:     Left lung sliding: Present      Right lung sliding: Present    Interpretation:     Impressions: negative

## 2022-06-04 LAB
ANION GAP SERPL CALCULATED.3IONS-SCNC: 11 MMOL/L (ref 4–13)
BUN SERPL-MCNC: 29 MG/DL (ref 5–25)
CALCIUM SERPL-MCNC: 8.6 MG/DL (ref 8.4–10.2)
CHLORIDE SERPL-SCNC: 97 MMOL/L (ref 96–108)
CO2 SERPL-SCNC: 28 MMOL/L (ref 21–32)
CREAT SERPL-MCNC: 1.46 MG/DL (ref 0.6–1.3)
ERYTHROCYTE [DISTWIDTH] IN BLOOD BY AUTOMATED COUNT: 15.1 % (ref 11.6–15.1)
GFR SERPL CREATININE-BSD FRML MDRD: 46 ML/MIN/1.73SQ M
GLUCOSE SERPL-MCNC: 68 MG/DL (ref 65–140)
HCT VFR BLD AUTO: 38.3 % (ref 36.5–49.3)
HGB BLD-MCNC: 12.3 G/DL (ref 12–17)
MAGNESIUM SERPL-MCNC: 2.4 MG/DL (ref 1.9–2.7)
MCH RBC QN AUTO: 30 PG (ref 26.8–34.3)
MCHC RBC AUTO-ENTMCNC: 32.1 G/DL (ref 31.4–37.4)
MCV RBC AUTO: 93 FL (ref 82–98)
PLATELET # BLD AUTO: 202 THOUSANDS/UL (ref 149–390)
PMV BLD AUTO: 10.4 FL (ref 8.9–12.7)
POTASSIUM SERPL-SCNC: 4 MMOL/L (ref 3.5–5.3)
RBC # BLD AUTO: 4.1 MILLION/UL (ref 3.88–5.62)
SODIUM SERPL-SCNC: 136 MMOL/L (ref 135–147)
WBC # BLD AUTO: 7.63 THOUSAND/UL (ref 4.31–10.16)

## 2022-06-04 PROCEDURE — 97116 GAIT TRAINING THERAPY: CPT

## 2022-06-04 PROCEDURE — 80048 BASIC METABOLIC PNL TOTAL CA: CPT | Performed by: PHYSICIAN ASSISTANT

## 2022-06-04 PROCEDURE — 99222 1ST HOSP IP/OBS MODERATE 55: CPT | Performed by: PHYSICIAN ASSISTANT

## 2022-06-04 PROCEDURE — 85027 COMPLETE CBC AUTOMATED: CPT | Performed by: PHYSICIAN ASSISTANT

## 2022-06-04 PROCEDURE — 97163 PT EVAL HIGH COMPLEX 45 MIN: CPT

## 2022-06-04 PROCEDURE — 83735 ASSAY OF MAGNESIUM: CPT | Performed by: PHYSICIAN ASSISTANT

## 2022-06-04 PROCEDURE — 99232 SBSQ HOSP IP/OBS MODERATE 35: CPT | Performed by: PHYSICIAN ASSISTANT

## 2022-06-04 PROCEDURE — 97167 OT EVAL HIGH COMPLEX 60 MIN: CPT

## 2022-06-04 RX ORDER — ENOXAPARIN SODIUM 100 MG/ML
30 INJECTION SUBCUTANEOUS EVERY 12 HOURS SCHEDULED
Status: DISCONTINUED | OUTPATIENT
Start: 2022-06-04 | End: 2022-06-07 | Stop reason: HOSPADM

## 2022-06-04 RX ORDER — ACETAMINOPHEN 325 MG/1
975 TABLET ORAL EVERY 8 HOURS
Status: DISCONTINUED | OUTPATIENT
Start: 2022-06-04 | End: 2022-06-07 | Stop reason: HOSPADM

## 2022-06-04 RX ORDER — SODIUM CHLORIDE, SODIUM GLUCONATE, SODIUM ACETATE, POTASSIUM CHLORIDE, MAGNESIUM CHLORIDE, SODIUM PHOSPHATE, DIBASIC, AND POTASSIUM PHOSPHATE .53; .5; .37; .037; .03; .012; .00082 G/100ML; G/100ML; G/100ML; G/100ML; G/100ML; G/100ML; G/100ML
250 INJECTION, SOLUTION INTRAVENOUS ONCE
Status: COMPLETED | OUTPATIENT
Start: 2022-06-04 | End: 2022-06-04

## 2022-06-04 RX ORDER — METHOCARBAMOL 500 MG/1
250 TABLET, FILM COATED ORAL EVERY 6 HOURS SCHEDULED
Status: DISCONTINUED | OUTPATIENT
Start: 2022-06-04 | End: 2022-06-07 | Stop reason: HOSPADM

## 2022-06-04 RX ADMIN — MEXILETINE HYDROCHLORIDE 150 MG: 150 CAPSULE ORAL at 16:46

## 2022-06-04 RX ADMIN — MEXILETINE HYDROCHLORIDE 150 MG: 150 CAPSULE ORAL at 08:49

## 2022-06-04 RX ADMIN — MEXILETINE HYDROCHLORIDE 150 MG: 150 CAPSULE ORAL at 22:47

## 2022-06-04 RX ADMIN — ENOXAPARIN SODIUM 30 MG: 30 INJECTION SUBCUTANEOUS at 20:58

## 2022-06-04 RX ADMIN — ACETAMINOPHEN 975 MG: 325 TABLET ORAL at 16:45

## 2022-06-04 RX ADMIN — AMIODARONE HYDROCHLORIDE 200 MG: 200 TABLET ORAL at 18:38

## 2022-06-04 RX ADMIN — AMIODARONE HYDROCHLORIDE 200 MG: 200 TABLET ORAL at 08:45

## 2022-06-04 RX ADMIN — ACETAMINOPHEN 975 MG: 325 TABLET ORAL at 22:49

## 2022-06-04 RX ADMIN — METHOCARBAMOL 250 MG: 500 TABLET, FILM COATED ORAL at 18:38

## 2022-06-04 RX ADMIN — OXYCODONE HYDROCHLORIDE 2.5 MG: 5 TABLET ORAL at 06:16

## 2022-06-04 RX ADMIN — ENOXAPARIN SODIUM 30 MG: 30 INJECTION SUBCUTANEOUS at 12:33

## 2022-06-04 RX ADMIN — SENNOSIDES 17.2 MG: 8.6 TABLET, FILM COATED ORAL at 08:45

## 2022-06-04 RX ADMIN — SACUBITRIL AND VALSARTAN 1 TABLET: 24; 26 TABLET, FILM COATED ORAL at 08:45

## 2022-06-04 RX ADMIN — OXYCODONE HYDROCHLORIDE 5 MG: 5 TABLET ORAL at 13:32

## 2022-06-04 RX ADMIN — CARVEDILOL 18.75 MG: 12.5 TABLET, FILM COATED ORAL at 06:30

## 2022-06-04 RX ADMIN — ACETAMINOPHEN 975 MG: 325 TABLET ORAL at 08:45

## 2022-06-04 RX ADMIN — OXYCODONE HYDROCHLORIDE 2.5 MG: 5 TABLET ORAL at 00:35

## 2022-06-04 RX ADMIN — ESCITALOPRAM OXALATE 20 MG: 20 TABLET ORAL at 20:58

## 2022-06-04 RX ADMIN — ATORVASTATIN CALCIUM 20 MG: 20 TABLET, FILM COATED ORAL at 16:45

## 2022-06-04 RX ADMIN — METHOCARBAMOL 250 MG: 500 TABLET, FILM COATED ORAL at 08:45

## 2022-06-04 RX ADMIN — DOCUSATE SODIUM 100 MG: 100 CAPSULE, LIQUID FILLED ORAL at 08:45

## 2022-06-04 RX ADMIN — HEPARIN SODIUM 7500 UNITS: 5000 INJECTION INTRAVENOUS; SUBCUTANEOUS at 06:02

## 2022-06-04 RX ADMIN — SODIUM CHLORIDE, SODIUM GLUCONATE, SODIUM ACETATE, POTASSIUM CHLORIDE, MAGNESIUM CHLORIDE, SODIUM PHOSPHATE, DIBASIC, AND POTASSIUM PHOSPHATE 250 ML: .53; .5; .37; .037; .03; .012; .00082 INJECTION, SOLUTION INTRAVENOUS at 16:42

## 2022-06-04 NOTE — PLAN OF CARE
Problem: OCCUPATIONAL THERAPY ADULT  Goal: Performs self-care activities at highest level of function for planned discharge setting  See evaluation for individualized goals  Description: Treatment Interventions: ADL retraining, Functional transfer training, Endurance training, UE strengthening/ROM, Patient/family training, Neuromuscular reeducation, Compensatory technique education, Continued evaluation, Activityengagement          See flowsheet documentation for full assessment, interventions and recommendations  Note: Limitation: Decreased ADL status, Decreased UE strength, Decreased UE ROM, Decreased Safe judgement during ADL, Decreased endurance, Decreased fine motor control, Decreased self-care trans, Decreased high-level ADLs  Prognosis: Fair  Assessment: Patient evaluated by Occupational Therapy  Patient admitted with Closed FX of shaft humeourus RT s/p fall   Patient is NWB in a coapation splint  The patients occupational profile, medical and therapy history includes a expanded review of medical and/or therapy records and additional review of physical, cognitive, or psychosocial history related to current functional performance  Comorbidities affecting functional mobility and ADLS include: CHF, hypertension and obesity  Prior to admission, patient was independent with functional mobility with cane when ambulating outside, independent with ADLS, independent with IADLS and living with spouse in a 1 level home with 4 front/3 garage steps to enter  Patient performed  Eating Set/up, groom Sup, UB bath Sup, UB dressing Mod A, LB total, toileting min A, bed mobility Mod A  Transfers Min A and functional ambulation CGA/Min A   The evaluation identifies the following performance deficits: weakness, impaired balance, decreased endurance, decreased coordination, increased fall risk, new onset of impairment of functional mobility, decreased ADLS, decreased IADLS, pain, decreased activity tolerance, decreased safety awareness and ortheopedic restrictions, that result in activity limitations and/or participation restrictions  This evaluation requires clinical decision making of high complexity, because the patient presents with comorbidites that affect occupational performance and required significant modification of tasks or assistance with consideration of multiple treatment options  The Barthel Index was used as a functional outcome tool presenting with a score of Barthel Index Score: 60The patient's raw score on the AM-PAC Daily Activity inpatient short form is 17, standardized score is 37 26, less than 39 4  Patients at this level are likely to benefit from DC to post-acute rehabilitation services  Please refer to the recommendation of the Occupational Therapist for safe DC planning  Patient will benefit from skilled Occupational Therapy services to address above deficits and facilitate a safe return to prior level of function       OT Discharge Recommendation: Post acute rehabilitation services  OT - OK to Discharge: Yes (when medically stable)

## 2022-06-04 NOTE — PLAN OF CARE
Problem: Potential for Falls  Goal: Patient will remain free of falls  Description: INTERVENTIONS:  - Educate patient/family on patient safety including physical limitations  - Instruct patient to call for assistance with activity   - Consult OT/PT to assist with strengthening/mobility   - Keep Call bell within reach  - Keep bed low and locked with side rails adjusted as appropriate  - Keep care items and personal belongings within reach  - Initiate and maintain comfort rounds  - Make Fall Risk Sign visible to staff  - - Apply yellow socks and bracelet for high fall risk patients  - Consider moving patient to room near nurses station  Outcome: Progressing     Problem: MOBILITY - ADULT  Goal: Maintain or return to baseline ADL function  Description: INTERVENTIONS:  -  Assess patient's ability to carry out ADLs; assess patient's baseline for ADL function and identify physical deficits which impact ability to perform ADLs (bathing, care of mouth/teeth, toileting, grooming, dressing, etc )  - Assess/evaluate cause of self-care deficits   - Assess range of motion  - Assess patient's mobility; develop plan if impaired  - Assess patient's need for assistive devices and provide as appropriate  - Encourage maximum independence but intervene and supervise when necessary  - Involve family in performance of ADLs  - Assess for home care needs following discharge   - Consider OT consult to assist with ADL evaluation and planning for discharge  - Provide patient education as appropriate  Outcome: Progressing  Goal: Maintains/Returns to pre admission functional level  Description: INTERVENTIONS:  - Perform BMAT or MOVE assessment daily    - Set and communicate daily mobility goal to care team and patient/family/caregiver     - Collaborate with rehabilitation services on mobility goals if consulted  -- Out of bed for toileting  - Record patient progress and toleration of activity level   Outcome: Progressing     Problem: Prexisting or High Potential for Compromised Skin Integrity  Goal: Skin integrity is maintained or improved  Description: INTERVENTIONS:  - Identify patients at risk for skin breakdown  - Assess and monitor skin integrity  - Assess and monitor nutrition and hydration status  - Monitor labs   - Assess for incontinence   - Turn and reposition patient  - Assist with mobility/ambulation  - Relieve pressure over bony prominences  - Avoid friction and shearing  - Provide appropriate hygiene as needed including keeping skin clean and dry  - Evaluate need for skin moisturizer/barrier cream  - Collaborate with interdisciplinary team   - Patient/family teaching  - Consider wound care consult   Outcome: Progressing

## 2022-06-04 NOTE — DISCHARGE INSTRUCTIONS
Discharge Instructions - Orthopedics  Mark Old 77 y o  male MRN: [id number]  Unit/Bed#: S -01    Weight Bearing Status:                                           NWB RUE    Pain:  Continue analgesics as directed    Dressing Instructions:   Keep splint clean and dry  DO NOT REMOVE OR GET WET    Appt Instructions: If you do not have your appointment, please call the clinic at [phone] to schedule follow up with Dr Alva Allen in 1-2 weeks  Otherwise followup as scheduled     Contact the office sooner if you experience any increased numbness/tingling in the extremities  Miscellaneous:   Ice to right arm

## 2022-06-04 NOTE — OCCUPATIONAL THERAPY NOTE
Occupational Therapy Evaluation     Patient Name: Mateus Simmons  VCKRU'Q Date: 6/4/2022  Problem List  Principal Problem:    Closed fracture of shaft of left humerus  Active Problems:    Fall    Ambulatory dysfunction    CKD (chronic kidney disease)    V-tach ([facility] [address] )    Hypertension    Hyperlipidemia    Past Medical History  Past Medical History:   Diagnosis Date    Bilateral hearing loss     CHF (congestive heart failure) (HCC)     Depression     Frequent falls     Hypertension     ICD (implantable cardioverter-defibrillator) in place     Ischemic cardiomyopathy     Morbid obesity ([facility] )     Sleep apnea     Ventricular tachycardia ([facility] )      Past Surgical History  Past Surgical History:   Procedure Laterality Date    CARDIAC ELECTROPHYSIOLOGY STUDY AND ABLATION      Ventricular tachycardia ablation at [address] by Dr Jessica Jeong           06/04/22 9834   Note Type   Note type Evaluation   Restrictions/Precautions   Weight Bearing Precautions Per Order Yes   RUE Weight Bearing Per Order NWB  (Per ortho note finger ROM)   Other Precautions Chair Alarm; Bed Alarm; Fall Risk;Pain;WBS;Hard of hearing   Pain Assessment   Pain Assessment Tool 0-10   Pain Score 4   Home Living   Type of Home House  (4-5 CLAUDIA front /garage 3 CLAUDIA w/ rail)   Home Layout One level   Bathroom Shower/Tub Walk-in shower   Bathroom Toilet Raised   Bathroom Equipment Grab bars in shower;Built-in shower seat   Home Equipment Cane;Walker   Additional Comments per patient uses a cane outside as recommended by PT 5 years prior inside doesn't use any AD as recommended bt PT 5 years prior   Prior Function   Level of [place] Independent with ADLs and functional mobility   Lives With [place] Help From Family   ADL Assistance Independent   IADLs Independent   Falls in the last 6 months 1 to 4  (1 previous fall)   Comments Per patient can't drive for 5 months due to ICD implant   Subjective   Subjective (S)  Per patient wife is limited on amount of assistance due to health condition   ADL   Eating Assistance 5  Supervision/Setup   Grooming Assistance 5  Supervision/Setup   Grooming Deficit Wash/dry hands; Wash/dry face; Increased time to complete;Steadying   UB Bathing Assistance 5  Supervision/Setup   UB Bathing Deficit Increased time to complete  (Lt hand washing Rt upper body)   UB Dressing Assistance 3  Moderate Assistance   LB Dressing Assistance 1  Total Assistance   LB Dressing Deficit Don/doff R sock; Don/doff L sock   Toileting Assistance  4  Minimal Assistance   Bed Mobility   Supine to Sit 3  Moderate assistance   Additional items Assist x 1; Increased time required;Verbal cues;LE management   Sit to Supine Unable to assess   Additional Comments Patient was instructed on NWB precauion when berforming bed mobility   Transfers   Sit to Stand 4  Minimal assistance   Additional items Assist x 1; Increased time required;Verbal cues   Stand to Sit 4  Minimal assistance   Additional items Assist x 1; Increased time required;Verbal cues   Toilet transfer 4  Minimal assistance   Functional Mobility   Additional Comments [facility] Min A intially progressing to CGA   Balance   Static Sitting Good   Dynamic Sitting Good   Static Standing Fair -   Dynamic Standing Fair -   Activity Tolerance   Activity Tolerance Patient tolerated treatment well   Nurse Made Aware RN   RUE Assessment   RUE Assessment   (unable due to precautions)   LUE Assessment   LUE Assessment WFL   LUE Strength   LUE Overall Strength   (4/5)   Hand Function   Gross Motor Coordination Impaired  (RT)   Fine Motor Coordination Impaired  (RT)   Cognition   Arousal/Participation Alert; Cooperative   Attention Within functional limits   Orientation Level Oriented X4   Following Commands Follows one step commands with increased time or repetition   Comments Pt ID by wristband name and    Assessment Limitation Decreased ADL status; Decreased UE strength;Decreased UE ROM; Decreased Safe judgement during ADL;Decreased endurance;Decreased fine motor control;Decreased self-care trans;Decreased high-level ADLs   Prognosis Fair   Assessment Patient evaluated by Occupational Therapy  Patient admitted with Closed FX of shaft humeourus RT s/p fall   Patient is NWB in a coapation splint  The patients occupational profile, medical and therapy history includes a expanded review of medical and/or therapy records and additional review of physical, cognitive, or psychosocial history related to current functional performance  Comorbidities affecting functional mobility and ADLS include: CHF, hypertension and obesity  Prior to admission, patient was independent with functional mobility with cane when ambulating outside, independent with ADLS, independent with IADLS and living with spouse in a 1 level home with 4 front/3 garage steps to enter  Patient performed  Eating Set/up, groom Sup, UB bath Sup, UB dressing Mod A, LB total, toileting min A, bed mobility Mod A  Transfers Min A and functional ambulation CGA/Min A  The evaluation identifies the following performance deficits: weakness, impaired balance, decreased endurance, decreased coordination, increased fall risk, new onset of impairment of functional mobility, decreased ADLS, decreased IADLS, pain, decreased activity tolerance, decreased safety awareness and ortheopedic restrictions, that result in activity limitations and/or participation restrictions  This evaluation requires clinical decision making of high complexity, because the patient presents with comorbidites that affect occupational performance and required significant modification of tasks or assistance with consideration of multiple treatment options    The Barthel Index was used as a functional outcome tool presenting with a score of Barthel Index Score: 60The patient's raw score on the AM-PAC Daily Activity inpatient short form is 17, standardized score is 37 26, less than 39 4  Patients at this level are likely to benefit from DC to post-acute rehabilitation services  Please refer to the recommendation of the Occupational Therapist for safe DC planning  Patient will benefit from skilled Occupational Therapy services to address above deficits and facilitate a safe return to prior level of function  Goals   Patient Goals " to get better to be more independent"   LTG Time Frame   (8-14)   Long Term Goal #1 Goals established to promote Patient Goals: go home  get stronger in my legs  be able to do more :  Patient will increase standing tolerance to 6 minutes during ADL task to decrease assistance level and decrease fall risk; Patient will increase bed mobility to supervision in preparation for ADLS and transfers; Patient will increase functional mobility to and from bathroom with single point cane with supervision to increase performance with ADLS and to use a toilet; Patient will tolerate 10 minutes of UE ROM/strengthening to increase general activity tolerance and performance in ADLS/IADLS; Patient will improve functional activity tolerance to 10 minutes of sustained functional tasks to increase participation in basic self-care and decrease assistance level;  Patient will be able to to verbalize understanding anPatient will increase dynamic standing balance to fair to improve postural stability and decrease fall risk during standing ADLS and transfers     Functional Transfer Goals   Pt Will Transfer To Bedside Commode With stand by assist   Pt Will Transfer To Toilet With mod indep   Pt Will Transfer To Shower With mod indep   ADL Goals   Pt Will Perform Eating Independently   Pt Will Perform Grooming With mod indep   Pt Will Perform Bathing With mod indep   Pt Will Perform UE Dressing With stand by assist   Pt Will Perform LE Dressing With stand by assist   Pt Will Perform Toileting With stand by assist Plan   Treatment Interventions ADL retraining;Functional transfer training; Endurance training;UE strengthening/ROM; Patient/family training;Neuromuscular reeducation; Compensatory technique education;Continued evaluation; Activityengagement   Goal Expiration Date 06/18/22   OT Frequency 3-5x/wk   Recommendation   OT Discharge Recommendation Post acute rehabilitation services   OT - OK to Discharge Yes  (when medically stable)   AM-PAC Daily Activity Inpatient   Lower Body Dressing 2   Bathing 3   Toileting 3   Upper Body Dressing 3   Grooming 3   Eating 3   Daily Activity Raw Score 17   Daily Activity Standardized Score (Calc for Raw Score >=11) 37 26   AM-PAC Applied Cognition Inpatient   Following a Speech/Presentation 4   Understanding Ordinary Conversation 4   Taking Medications 4   Remembering Where Things Are Placed or Put Away 4   Remembering List of 4-5 Errands 3   Taking Care of Complicated Tasks 3   Applied Cognition Raw Score 22   Applied Cognition Standardized Score 47 83   Barthel Index   Feeding 5   Bathing 0   Grooming Score 0   Dressing Score 5   Bladder Score 10   Bowels Score 10   Toilet Use Score 5   Transfers (Bed/Chair) Score 10   Mobility (Level Surface) Score 10   Stairs Score 5   Barthel Index Score 60   Patient was educated on finger ROM exercises and NWB precautions when performing gentle exercises    Mahad , OT

## 2022-06-04 NOTE — PHYSICAL THERAPY NOTE
Physical Therapy Cancellation Note       06/04/22 1031   PT Last Visit   PT Visit Date 06/04/22   Note Type   Note type Cancelled Session   Cancel Reasons Other   Additional Comments referral received for PT eval and tx  attempted to see pt for eval but pt has pending orthopedics consult regarding left humeral fx  will await consult completion and perform eval as appropriate       Rosales Maravilla, PT

## 2022-06-04 NOTE — ASSESSMENT & PLAN NOTE
- Acute left humeral shaft fracture, present on admission   - Appreciate Orthopedic surgery evaluation and recommendations  Anticipate non-operative management  - Maintain NON-weightbearing status on the left upper extremity in splint   - Monitor left upper extremity neurovascular exam   - Continue multimodal analgesic regimen   - Continue DVT prophylaxis  - PT and OT evaluation and treatment as indicated  - Outpatient follow up with Orthopedic surgery for re-evaluation

## 2022-06-04 NOTE — PLAN OF CARE
----- Message from EUGENIE Hernandez sent at 1/18/2022 12:33 PM CST -----  Regarding: Abnormal UA  Please see if urine culture can be added on to UA, thought it was reflex culture order, but only reflex micro. Otherwise call and see how patient is doing.     Problem: PHYSICAL THERAPY ADULT  Goal: Performs mobility at highest level of function for planned discharge setting  See evaluation for individualized goals  Description: Treatment/Interventions: Functional transfer training, LE strengthening/ROM, Elevations, Therapeutic exercise, Endurance training, Patient/family training, Equipment eval/education, Bed mobility, Gait training          See flowsheet documentation for full assessment, interventions and recommendations  Note: Prognosis: Fair  Problem List: Decreased strength, Decreased range of motion, Decreased endurance, Impaired balance, Decreased mobility, Decreased safety awareness, Obesity, Pain, Orthopedic restrictions  Assessment: Pt presents with right upper arm pain  He experienced a mechanical fall down a step on to driveway  Dx: right displaced transfer humeral midshaft fx, ambulatory dysfunction, CKD, and ventricular tachycardia  order placed for PT eval and tx, w/ activity order of up w/ assist  Orthopedics consult status NWB R UE w/ coaptation splint  pt presents w/ comorbidities of CHF, HTN, ICD, ICMP, morbid obesity, and ventricular tachycardia and personal factors of advanced age, stair(s) to enter home, positive fall history, hearing impairments and depression  pt presents w/ pain, weakness, decreased ROM, decreased endurance, impaired balance, gait deviations, impaired hearing, decreased safety awareness, orthopedic restrictions and fall risk  these impairments are evident in findings from physical examination (weakness and decreased ROM), mobility assessment (need for min to mod assist w/ all phases of mobility when usually mobilizing independently, tolerance to only 6 feet of ambulation and need for cueing for mobility technique), and Barthel Index: 40/100 and 30 second chair stand test: 0 (less than 11 indicates fall risk in males 76to 78years old)  pt needed input for task focus and mobility technique   pt is at risk for falls due to physical and safety awareness deficits  pt's clinical presentation is unstable/unpredictable (evident in bradycardia, poor blood pressure control, need for assist w/ all phases of mobility when usually mobilizing independently, tolerance to only 6 feet of ambulation, pain impacting overall mobility status and need for input for mobility technique/safety)  pt needs inpatient PT tx to improve mobility deficits and progress mobility training as appropriate  discharge recommendation is for inpatient rehab to reduce fall risk and maximize level of functional independence  PT Discharge Recommendation: Post acute rehabilitation services          See flowsheet documentation for full assessment

## 2022-06-04 NOTE — CONSULTS
Consultation - Allen Quinones White 77 y o  male MRN: [id number]  Unit/Bed#: S -01 Encounter: [id number]      Assessment/Plan     Assessment:  Right mildly displaced transverse humeral midshaft fracture    Plan:  Dr Mati Nathan discussed surgical vs nonsurgical options  IMN vs coaptation splint with conversion to clamshell brace as outpatient  Patient would like to proceed with nonsurgical treatment at this time  Discussed outpatient surgery can be scheduled if fracture displaces  Continue coaptation splint  NWB to RUE  Ice to RUE  Encouraged finger ROM  Follow up as outpatient in 1-2 weeks with Dr Mati Nathan  History of Present Illness   Physician Requesting Consult: Garrison Winter MD  Reason for Consult / Principal Problem: right shoulder injury  HPI: Erika Gibbs is a 77y o  year old male who presents with right shoulder pain after suffering a mechanical fall in his driveway yesterday  He landed on his right arm and felt immediate pain  He was brought to the emergency room and x-rays revealed a midshaft humerus fracture  He was placed in a coaptation splint and admitted to the trauma service  Orthopedics was consulted  He denies any prior injury to that arm  Inpatient consult to Orthopedic Surgery  Consult performed by: Tawana Chen PA-C  Consult ordered by: John Lee PA-C          Review of Systems   Constitutional: Positive for activity change  HENT: Negative  Eyes: Negative  Respiratory: Negative  Cardiovascular: Negative  Gastrointestinal: Negative  Endocrine: Negative  Genitourinary: Negative  Musculoskeletal: Positive for arthralgias and joint swelling  Skin: Negative  Neurological: Negative  Hematological: Negative  Psychiatric/Behavioral: Negative          Historical Information   Past Medical History:   Diagnosis Date    Bilateral hearing loss     CHF (congestive heart failure) ([facility] [address] )     Depression     Frequent falls     Hypertension     ICD (implantable cardioverter-defibrillator) in place     Ischemic cardiomyopathy     Morbid obesity ([address] )     Sleep apnea     Ventricular tachycardia ([facility] [place] [address] )      Past Surgical History:   Procedure Laterality Date    CARDIAC ELECTROPHYSIOLOGY STUDY AND ABLATION      Ventricular tachycardia ablation at [address] by Dr Humberto Henley History   Social History     Substance and Sexual Activity   Alcohol Use Not Currently     Social History     Substance and Sexual Activity   Drug Use Not Currently     E-Cigarette/Vaping    E-Cigarette Use Never User      E-Cigarette/Vaping Substances     Social History     Tobacco Use   Smoking Status Never Smoker   Smokeless Tobacco Never Used     Family History: non-contributory    Meds/Allergies   all current active meds have been reviewed  No Known Allergies    Objective   Vitals: Blood pressure 121/74, pulse 64, temperature 98 °F (36 7 °C), resp  rate 16, height 5' 4" (1 626 m), weight 117 kg (257 lb 11 5 oz), SpO2 94 %  ,Body mass index is 44 24 kg/m²  No intake or output data in the 24 hours ending 06/04/22 1025  No intake/output data recorded  Invasive Devices  Report    Peripheral Intravenous Line  Duration           Peripheral IV 06/04/22 Left;Ventral (anterior) Forearm <1 day                Physical Exam  Vitals and nursing note reviewed  Constitutional:       Appearance: He is well-developed  HENT:      Head: Normocephalic and atraumatic  Eyes:      Conjunctiva/sclera: Conjunctivae normal    Cardiovascular:      Rate and Rhythm: Normal rate and regular rhythm  Heart sounds: No murmur heard  Pulmonary:      Effort: Pulmonary effort is normal  No respiratory distress  Breath sounds: Normal breath sounds  Abdominal:      Palpations: Abdomen is soft  Tenderness: There is no abdominal tenderness  Musculoskeletal:         General: Swelling, tenderness and signs of injury present  Cervical back: Neck supple  Skin:     General: Skin is warm and dry  Neurological:      Mental Status: He is alert and oriented to person, place, and time  Psychiatric:         Mood and Affect: Mood normal        Right Shoulder Exam     Other   Sensation: normal  Pulse: present    Comments:  Coaptation splint intact  Able to move all fingers  Capillary refill brisk            Lab Results: I have personally reviewed pertinent lab results    Imaging Studies: I have personally reviewed pertinent films in PACS  X-ray right humerus: mildly displaced transverse humeral midshaft fracture

## 2022-06-04 NOTE — PHYSICAL THERAPY NOTE
PHYSICAL THERAPY EVALUATION NOTE    Patient Name: Dayan Escobar  JRGIY'N Date: 6/4/2022  AGE:   77 y o  Mrn:   [id number]  ADMIT DX:  Weakness [R53 1]    Past Medical History:   Diagnosis Date    Bilateral hearing loss     CHF (congestive heart failure) (HCC)     Depression     Frequent falls     Hypertension     ICD (implantable cardioverter-defibrillator) in place     Ischemic cardiomyopathy     Morbid obesity ([facility] [address] )     Sleep apnea     Ventricular tachycardia ([facility] [address] )      Length Of Stay: 1  PHYSICAL THERAPY EVALUATION :    06/04/22 1218   PT Last Visit   PT Visit Date 06/04/22   Pain Assessment   Pain Assessment Tool 0-10   Pain Score 3   Pain Location/Orientation Orientation: Right;Location: Shoulder   Hospital Pain Intervention(s) Repositioned   Restrictions/Precautions   RUE Weight Bearing Per Order NWB   Braces or Orthoses Other (Comment)  (coaptation splint R UE)   Other Precautions Chair Alarm; Bed Alarm;WBS;Fall Risk;Pain  (masimo)   Home Living   Type of [address] [place] [address] One level; Other (Comment)  (3 [address] w/ railing)   Additional Comments lives w/ spouse  ambulates w/o assistive device indoors and uses quad cane in community  owns walker  independent w/ ADLs and driving  1 fall in last 6 months  General   Additional Pertinent History 6/3/22 at 18:50, heart rate was 50 BPM    Family/Caregiver Present No   Cognition   Arousal/Participation Alert   Orientation Level Oriented to person; Other (Comment)  (pt was identified w/ full name, birth date)   Following Commands Follows one step commands with increased time or repetition   Comments seated blood pressure was 86/58  Lawerance Nicole was notified  room air resting pulse ox 96% and 56 BPM, active 94% and 71 BPM    Subjective   Subjective pt seen sitting in chair  states having right shoulder pain   pt agreed to PT eval    RUE Assessment   RUE Assessment X  (not tested)   LUIS ANGEL Assessment   LUE Assessment WFL   RLE Assessment   RLE Assessment WFL  (3+ to 4-/5, светлана 3/5)   LLE Assessment   LLE Assessment WFL  (3+ to 4-/5, светлана 3/5)   Coordination   Movements are Fluid and Coordinated 1   Sensation X  (impaired hearing)   Light Touch   RLE Light Touch Grossly intact   LLE Light Touch Grossly intact   Transfers   Sit to Stand 3  Moderate assistance   Additional items Assist x 1; Increased time required;Verbal cues  (for hand placement, LE positioning)   Stand to Sit 4  Minimal assistance   Additional items Assist x 1; Increased time required;Verbal cues  (for body positioning)   Additional Comments 30 second chair stand: 0 (as pt is unable to stand w/o use of UEs)  Ambulation/Elevation   Gait pattern Wide RODY; Short stride; Excessively slow   Gait Assistance 3  Moderate assist   Additional items Assist x 1;Verbal cues  (for full step length, breathing technique)   Assistive Device None   Distance 6 feet  (additional not possible due to fatigue and pain)   Stair Management Assistance Not tested  (due to limited ambulation tolerance, fall risk)   Balance   Static Sitting Fair +   Static Standing Poor +   Ambulatory Poor   Activity Tolerance   Activity Tolerance Patient limited by fatigue;Patient limited by pain   Nurse Made Aware spoke to Columba Keller AP, SUSAN DE JESUSG, Torrie PCA   Assessment   Prognosis Fair   Problem List Decreased strength;Decreased range of motion;Decreased endurance; Impaired balance;Decreased mobility; Decreased safety awareness; Obesity;Pain;Orthopedic restrictions   Assessment Pt presents with right upper arm pain  He experienced a mechanical fall down a step on to driveway  Dx: right displaced transfer humeral midshaft fx, ambulatory dysfunction, CKD, and ventricular tachycardia  order placed for PT eval and tx, w/ activity order of up w/ assist  Orthopedics consult status NWB R UE w/ coaptation splint   pt presents w/ comorbidities of CHF, HTN, ICD, ICMP, morbid obesity, and ventricular tachycardia and personal factors of advanced age, stair(s) to enter home, positive fall history, hearing impairments and depression  pt presents w/ pain, weakness, decreased ROM, decreased endurance, impaired balance, gait deviations, impaired hearing, decreased safety awareness, orthopedic restrictions and fall risk  these impairments are evident in findings from physical examination (weakness and decreased ROM), mobility assessment (need for min to mod assist w/ all phases of mobility when usually mobilizing independently, tolerance to only 6 feet of ambulation and need for cueing for mobility technique), and Barthel Index: 40/100 and 30 second chair stand test: 0 (less than 11 indicates fall risk in males 76to 78years old)  pt needed input for task focus and mobility technique  pt is at risk for falls due to physical and safety awareness deficits  pt's clinical presentation is unstable/unpredictable (evident in bradycardia, poor blood pressure control, need for assist w/ all phases of mobility when usually mobilizing independently, tolerance to only 6 feet of ambulation, pain impacting overall mobility status and need for input for mobility technique/safety)  pt needs inpatient PT tx to improve mobility deficits and progress mobility training as appropriate  discharge recommendation is for inpatient rehab to reduce fall risk and maximize level of functional independence  Goals   Patient Goals go home  get stronger in my legs  be able to do more  STG Expiration Date 06/14/22   Short Term Goal #1 pt will:  Increase bilateral LE strength 1/2 grade to facilitate independent mobility, Perform bed mobility w/ supervision to increase level of independence, Perform all transfers w/ supervision to improve independence, Ambulate 150 ft  with least restrictive assistive device w/ supervision w/o LOB to improve functional independence, Navigate 3 stair(s) w/ minx1 with unilateral handrail to facilitate return to previous living environment, Increase all balance 1 grade to decrease risk for falls, Complete exercise program independently to increase strength and endurance, Tolerate 3 hr OOB to faciliate upright tolerance, Complete 30 Second Chair Stand Test w/ score of 4 to decrease fall risk, Improve Barthel Index score to 60 or greater to facilitate independence, Recall and adhere to NWB of R UE to facilitate surgical healing and Complete Timed Up and Go or Comfortable Gait Speed to further assess mobility and monitor progress   PT Treatment Day 1   Plan   Treatment/Interventions Functional transfer training;LE strengthening/ROM; Elevations; Therapeutic exercise; Endurance training;Patient/family training;Equipment eval/education; Bed mobility;Gait training   PT Frequency 4-6x/wk   Recommendation   PT Discharge Recommendation Post acute rehabilitation services   Additional Comments recommend hemiwalker use w/ mobilty   AM-PAC Basic Mobility Inpatient   Turning in Bed Without Bedrails 3   Lying on Back to Sitting on Edge of Flat Bed 2   Moving Bed to Chair 2   Standing Up From Chair 2   Walk in Room 2   Climb 3-5 Stairs 1   Basic Mobility Inpatient Raw Score 12   Basic Mobility Standardized Score 32 23   Highest Level Of Mobility   -[facility] Goal 4: Move to chair/commode   -[facility] Achieved 6: Walk 10 steps or more   Barthel Index   Feeding 5   Bathing 0   Grooming Score 0   Dressing Score 0   Bladder Score 10   Bowels Score 10   Toilet Use Score 5   Transfers (Bed/Chair) Score 10   Mobility (Level Surface) Score 0   Stairs Score 0   Barthel Index Score 40   Additional Treatment Session   Start Time 1218   End Time 1228   Treatment Assessment Pt agreed to participate in PT intervention  Therapist introduced hemiwalker use w/ mobility to address impairments noted during evaluation  Sit < - > stand tranfer w/ min to modx1  Ambulated 20 feet x2 w/ standing rest break w/ hemiwalker and minx1   Additional ambulation was not possible due to fatigue and pain  Pt was noted to have improvement in mobility status w/ use of walker w/ decreased level of assistance and improved ambulation tolerance  Pt continues to require min to mod assist and verbal cues w/ mobility for proper technique/safety  Pt is at risk for falling  continued inpatient PT tx is indicated to reduce fall risk factors  Equipment Use hemiwalker   Additional Treatment Day 1   End of Consult   Patient Position at End of Consult Bedside chair;Bed/Chair alarm activated; All needs within reach     The patient's AM-PAC Basic Mobility Inpatient Short Form Raw Score is 12  A Raw score of less than or equal to 16 suggests the patient may benefit from discharge to post-acute rehabilitation services  Please also refer to the recommendation of the Physical Therapist for safe discharge planning  30 second chair stand test: 0 (less than 11 indicates fall risk in males 76to 78years old)  Skilled PT recommended while in hospital and upon DC to progress pt toward treatment goals       Guilherme Vazquez, PT

## 2022-06-04 NOTE — PROGRESS NOTES
[facility]  Progress Note - Kendy Flores 1945, 77 y o  male MRN: [id number]  Unit/Bed#: S -01 Encounter: [id number]  Primary Care Provider: Ashley Miller DO   Date and time admitted to hospital: 6/3/2022  4:36 PM    Fall  Assessment & Plan  - Status post fall with the below noted injuries  - Fall precautions  - Geriatric Medicine consultation for evaluation, medication review and recommendations   - PT and OT evaluation and treatment as indicated  - Case Management consultation for disposition planning  Ambulatory dysfunction  Assessment & Plan  - Patient with chronic ambulatory dysfunction, status post fall today  - Maintain fall precautions  - Up with assistance only  - Await PT and OT evaluation recommendations  * Closed fracture of shaft of left humerus  Assessment & Plan  - Acute left humeral shaft fracture, present on admission   - Appreciate Orthopedic surgery evaluation and recommendations  Anticipate non-operative management  - Maintain NON-weightbearing status on the left upper extremity in splint   - Monitor left upper extremity neurovascular exam   - Continue multimodal analgesic regimen   - Continue DVT prophylaxis  - PT and OT evaluation and treatment as indicated  - Outpatient follow up with Orthopedic surgery for re-evaluation  CKD (chronic kidney disease)  Assessment & Plan  Lab Results   Component Value Date    EGFR 46 06/04/2022    EGFR 37 06/03/2022    CREATININE 1 46 (H) 06/04/2022    CREATININE 1 76 (H) 06/03/2022     - Patient with baseline history of CKD stage 3 with baseline creatinine appearing to be between 1 4 & 1 5   - Creatinine on presentation mildly elevated above baseline to 1 76  Creatinine improved to 1 46 by 06/04/2022  - Avoid nephrotoxic medications and hypotension   - Monitor volume status with daily weights and accurate I/Os  - Continue to monitor electrolytes and renal function      V-tach [facility])  Assessment & Plan  - Patient with chronic history of V-tach status post ablation at [facility] as well as history of ischemic cardiomyopathy with ICD in place   - Patient did have episode of V-tach requiring defibrillation on 05/13/2022 per outpatient notes  - Consider inpatient Cardiology consultation if patient requires operative intervention for perioperative evaluation and risk assessment   - Continue home medication regimen including Amiodarone and Mexiletine   - No evidence that the patient's fall was cardiac related  Hypertension  Assessment & Plan  - Chronic history of hypertension   - Continue current medication regimen with Coreg  Diuretic on hold due to elevation of creatinine above baseline in setting of chronic kidney disease   - Adjust management as indicated  - Outpatient follow-up per routine  Hyperlipidemia  Assessment & Plan  - Chronic history of hyperlipidemia   - Continue medication regimen   - Outpatient follow-up per routine  TERTIARY TRAUMA SURVEY NOTE    Prophylaxis: Sequential compression device (Venodyne)  and Heparin    Disposition:  Continue current level of care and await orthopedic surgery final recommendations as well as therapy evaluation recommendations  Case Management following for disposition planning  Code status:  Level 1 - Full Code    Consultants:     1  Orthopedic surgery  2  Geriatric Medicine  SUBJECTIVE:     Transfer from: N/A  Mechanism of Injury:Fall    Chief Complaint:  My right arm still hurts      HPI/Last 24 hour events:  Patient is overall doing pretty well  He continues to have pain in his right upper arm, but notes no new or worsening pain  He denies any numbness/weakness/tingling in the right upper extremity  He has some mild pain of the posterior scalp, but denies headache, visual changes, lightheadedness or dizziness  He is tolerating his diet without nausea or vomiting  He has no new complaints today      Active medications: Current Facility-Administered Medications:     acetaminophen (TYLENOL) tablet 975 mg, 975 mg, Oral, Q8H, 975 mg at 06/04/22 0845    amiodarone tablet 200 mg, 200 mg, Oral, BID, 200 mg at 06/04/22 0845    atorvastatin (LIPITOR) tablet 20 mg, 20 mg, Oral, Daily With Dinner, 20 mg at 06/03/22 2119    carvedilol (COREG) tablet 18 75 mg, 18 75 mg, Oral, BID With Meals, 18 75 mg at 06/04/22 0630    docusate sodium (COLACE) capsule 100 mg, 100 mg, Oral, BID, 100 mg at 06/04/22 0845    escitalopram (LEXAPRO) tablet 20 mg, 20 mg, Oral, Daily, 20 mg at 06/03/22 2230    heparin (porcine) subcutaneous injection 7,500 Units, 7,500 Units, Subcutaneous, Q8H [address], 7,500 Units at 06/04/22 0602    HYDROmorphone HCl (DILAUDID) injection 0 2 mg, 0 2 mg, Intravenous, Q3H PRN    methocarbamol (ROBAXIN) tablet 250 mg, 250 mg, Oral, Q6H MIHIR, 250 mg at 06/04/22 0845    mexiletine (MEXITIL) capsule 150 mg, 150 mg, Oral, TID, 150 mg at 06/04/22 0849    oxyCODONE (ROXICODONE) IR tablet 2 5 mg, 2 5 mg, Oral, Q4H PRN, 2 5 mg at 06/04/22 0616    oxyCODONE (ROXICODONE) IR tablet 5 mg, 5 mg, Oral, Q4H PRN    sacubitril-valsartan (ENTRESTO) 24-26 MG per tablet 1 tablet, 1 tablet, Oral, BID, 1 tablet at 06/04/22 0845    senna (SENOKOT) tablet 17 2 mg, 2 tablet, Oral, Daily, 17 2 mg at 06/04/22 0845      OBJECTIVE:     Vitals:   Vitals:    06/04/22 0700   BP: 121/74   Pulse: 64   Resp: 16   Temp: 98 °F (36 7 °C)   SpO2: 94%       Physical Exam:   GENERAL APPEARANCE: Patient in no acute distress  HEENT: NC, healing posterior scalp laceration with staples in place and mild surrounding swelling and tenderness; PERRL, EOMs intact; Mucous membranes moist  NECK / BACK: No midline cervical, thoracic or lumbar spine tenderness, step-offs or deformities  No paraspinal muscular tenderness in the neck or back  CV: Regular rate and rhythm; no murmur/gallops/rubs appreciated    CHEST / LUNGS: Clear to auscultation; no wheezes/rales/rhonci  ABD: NABS; soft; non-distended; non-tender  :  Voiding spontaneously  EXT: +2 pulses bilaterally upper & lower extremities  Normal range of motion in the left upper and bilateral lower extremities without pain, tenderness or deformity  The right upper extremity is swollen, moderately tender over the humerus with splints in place and are clean/dry/intact with intact neurovascular exam distally  NEURO: GCS 15; no focal neurologic deficits; neurovascularly intact  SKIN: Warm, dry and well perfused; no rash; no jaundice  Chronic venous stasis changes to the bilateral lower legs  1  Before the illness or injury that brought you to the Emergency, did you need someone to help you on a regular basis? 1=Yes   2  Since the illness or injury that brought you to the Emergency, have you needed more help than usual to take care of yourself? 1=Yes   3  Have you been hospitalized for one or more nights during the past 6 months (excluding a stay in the Emergency Department)? 0=No   4  In general, do you see well? 0=Yes   5  In general, do you have serious problems with your memory? 0=No   6  Do you take more than three different medications everyday? 1=Yes   TOTAL   3     Did you order a geriatric consult if the score was 2 or greater?: yes                I/O:   I/O     None          Invasive Devices: Invasive Devices  Report    Peripheral Intravenous Line  Duration           Peripheral IV 06/04/22 Left;Ventral (anterior) Forearm <1 day                  Imaging:   XR humerus right    Result Date: 6/4/2022  Impression: Improved alignment in a fracture of the mid right humeral shaft  Workstation performed: [id number]     TRAUMA - CT head wo contrast    Result Date: 6/3/2022  Impression: No acute intracranial abnormality  Posterior occipital scalp contusion/laceration without calvarial fracture   Workstation performed: [id number]     TRAUMA - CT spine cervical wo contrast    Result Date: 6/3/2022  Impression: No cervical spine fracture or traumatic malalignment  I personally discussed this study as well as results of head CT with Dr Frank Richard on 6/3/2022 at 5:20 PM   Workstation performed: [id number]     XR Trauma multiple (SLB/SLRA trauma bay ONLY)    Result Date: 6/3/2022  Impression: No acute cardiopulmonary disease within limitations of supine imaging  Displaced, angulated right mid humeral shaft fracture  Workstation performed: [id number]     CT upper extremity wo contrast right    Result Date: 6/3/2022  Impression: Displaced and angulated right mid humeral shaft fracture  Negative for proximal right humeral fracture  Limited evaluation of the distal humerus  If there is tenderness in this region, consider dedicated plain film imaging of the elbow   Workstation performed: [id number]       Labs:   CBC:   Lab Results   Component Value Date    WBC 7 63 06/04/2022    HGB 12 3 06/04/2022    HCT 38 3 06/04/2022    MCV 93 06/04/2022     06/04/2022    MCH 30 0 06/04/2022    MCHC 32 1 06/04/2022    RDW 15 1 06/04/2022    MPV 10 4 06/04/2022    NRBC 0 06/03/2022     CMP:   Lab Results   Component Value Date    CL 97 06/04/2022    CO2 28 06/04/2022    BUN 29 (H) 06/04/2022    CREATININE 1 46 (H) 06/04/2022    CALCIUM 8 6 06/04/2022    AST 60 (H) 06/03/2022    ALT 82 (H) 06/03/2022    ALKPHOS 102 06/03/2022    EGFR 46 06/04/2022       Ciara Magallanes PA-C  6/4/2022 07:53 AM

## 2022-06-04 NOTE — ASSESSMENT & PLAN NOTE
Lab Results   Component Value Date    EGFR 46 06/04/2022    EGFR 37 06/03/2022    CREATININE 1 46 (H) 06/04/2022    CREATININE 1 76 (H) 06/03/2022     - Patient with baseline history of CKD stage 3 with baseline creatinine appearing to be between 1 4 & 1 5   - Creatinine on presentation mildly elevated above baseline to 1 76  Creatinine improved to 1 46 by 06/04/2022  - Avoid nephrotoxic medications and hypotension   - Monitor volume status with daily weights and accurate I/Os  - Continue to monitor electrolytes and renal function

## 2022-06-04 NOTE — ASSESSMENT & PLAN NOTE
- Patient with chronic history of V-tach status post ablation at [facility] as well as history of ischemic cardiomyopathy with ICD in place   - Patient did have episode of V-tach requiring defibrillation on 05/13/2022 per outpatient notes  - Consider inpatient Cardiology consultation if patient requires operative intervention for perioperative evaluation and risk assessment   - Continue home medication regimen including Amiodarone and Mexiletine   - No evidence that the patient's fall was cardiac related

## 2022-06-05 PROBLEM — S42.301A CLOSED FRACTURE OF SHAFT OF RIGHT HUMERUS: Status: ACTIVE | Noted: 2022-06-03

## 2022-06-05 LAB
ATRIAL RATE: 62 BPM
P AXIS: 124 DEGREES
PR INTERVAL: 112 MS
QRS AXIS: 0 DEGREES
QRSD INTERVAL: 68 MS
QT INTERVAL: 418 MS
QTC INTERVAL: 424 MS
T WAVE AXIS: 69 DEGREES
VENTRICULAR RATE: 62 BPM

## 2022-06-05 PROCEDURE — 99232 SBSQ HOSP IP/OBS MODERATE 35: CPT | Performed by: SURGERY

## 2022-06-05 PROCEDURE — 93010 ELECTROCARDIOGRAM REPORT: CPT | Performed by: INTERNAL MEDICINE

## 2022-06-05 RX ORDER — GABAPENTIN 100 MG/1
100 CAPSULE ORAL 3 TIMES DAILY
Status: DISCONTINUED | OUTPATIENT
Start: 2022-06-05 | End: 2022-06-07 | Stop reason: HOSPADM

## 2022-06-05 RX ORDER — TORSEMIDE 20 MG/1
40 TABLET ORAL DAILY
Status: DISCONTINUED | OUTPATIENT
Start: 2022-06-05 | End: 2022-06-07 | Stop reason: HOSPADM

## 2022-06-05 RX ADMIN — GABAPENTIN 100 MG: 100 CAPSULE ORAL at 20:06

## 2022-06-05 RX ADMIN — MEXILETINE HYDROCHLORIDE 150 MG: 150 CAPSULE ORAL at 20:06

## 2022-06-05 RX ADMIN — ESCITALOPRAM OXALATE 20 MG: 20 TABLET ORAL at 20:06

## 2022-06-05 RX ADMIN — ENOXAPARIN SODIUM 30 MG: 30 INJECTION SUBCUTANEOUS at 20:06

## 2022-06-05 RX ADMIN — SACUBITRIL AND VALSARTAN 1 TABLET: 24; 26 TABLET, FILM COATED ORAL at 10:11

## 2022-06-05 RX ADMIN — AMIODARONE HYDROCHLORIDE 200 MG: 200 TABLET ORAL at 17:52

## 2022-06-05 RX ADMIN — ACETAMINOPHEN 975 MG: 325 TABLET ORAL at 16:36

## 2022-06-05 RX ADMIN — MEXILETINE HYDROCHLORIDE 150 MG: 150 CAPSULE ORAL at 16:38

## 2022-06-05 RX ADMIN — MEXILETINE HYDROCHLORIDE 150 MG: 150 CAPSULE ORAL at 11:38

## 2022-06-05 RX ADMIN — ACETAMINOPHEN 975 MG: 325 TABLET ORAL at 09:11

## 2022-06-05 RX ADMIN — ATORVASTATIN CALCIUM 20 MG: 20 TABLET, FILM COATED ORAL at 16:35

## 2022-06-05 RX ADMIN — METHOCARBAMOL 250 MG: 500 TABLET, FILM COATED ORAL at 14:46

## 2022-06-05 RX ADMIN — SENNOSIDES 17.2 MG: 8.6 TABLET, FILM COATED ORAL at 09:12

## 2022-06-05 RX ADMIN — GABAPENTIN 100 MG: 100 CAPSULE ORAL at 16:37

## 2022-06-05 RX ADMIN — METHOCARBAMOL 250 MG: 500 TABLET, FILM COATED ORAL at 05:55

## 2022-06-05 RX ADMIN — OXYCODONE HYDROCHLORIDE 5 MG: 5 TABLET ORAL at 09:19

## 2022-06-05 RX ADMIN — GABAPENTIN 100 MG: 100 CAPSULE ORAL at 11:44

## 2022-06-05 RX ADMIN — METHOCARBAMOL 250 MG: 500 TABLET, FILM COATED ORAL at 00:11

## 2022-06-05 RX ADMIN — CARVEDILOL 18.75 MG: 12.5 TABLET, FILM COATED ORAL at 10:11

## 2022-06-05 RX ADMIN — AMIODARONE HYDROCHLORIDE 200 MG: 200 TABLET ORAL at 09:12

## 2022-06-05 RX ADMIN — TORSEMIDE 40 MG: 20 TABLET ORAL at 11:44

## 2022-06-05 RX ADMIN — METHOCARBAMOL 250 MG: 500 TABLET, FILM COATED ORAL at 17:52

## 2022-06-05 RX ADMIN — DOCUSATE SODIUM 100 MG: 100 CAPSULE, LIQUID FILLED ORAL at 17:52

## 2022-06-05 RX ADMIN — DOCUSATE SODIUM 100 MG: 100 CAPSULE, LIQUID FILLED ORAL at 09:12

## 2022-06-05 RX ADMIN — SACUBITRIL AND VALSARTAN 1 TABLET: 24; 26 TABLET, FILM COATED ORAL at 17:51

## 2022-06-05 RX ADMIN — ENOXAPARIN SODIUM 30 MG: 30 INJECTION SUBCUTANEOUS at 10:10

## 2022-06-05 NOTE — PROGRESS NOTES
Pt is running soft BPs throughout night shift  Most recent 80/48 around 3a  Patient is asymptomatic  Trauma team was notified  Will continue to monitor

## 2022-06-05 NOTE — ASSESSMENT & PLAN NOTE
- Patient with chronic history of V-tach status post ablation at [facility] as well as history of ischemic cardiomyopathy with ICD in place   - Patient did have episode of V-tach requiring defibrillation on 05/13/2022 per outpatient notes  - Consider inpatient Cardiology consultation if patient requires operative intervention for perioperative evaluation and risk assessment   - Continue home medication regimen including Amiodarone and Mexiletine   - No evidence that the patient's fall was cardiac related

## 2022-06-05 NOTE — ASSESSMENT & PLAN NOTE
- Patient with chronic ambulatory dysfunction, status post fall  - Maintain fall precautions  - Up with assistance only  - Continue PT and OT evaluation and treatment as indicated

## 2022-06-05 NOTE — ASSESSMENT & PLAN NOTE
- Patient with chronic ambulatory dysfunction, status post fall today  - Maintain fall precautions  - Up with assistance only  - Continue PT and OT evaluation and treatment as indicated

## 2022-06-05 NOTE — ASSESSMENT & PLAN NOTE
- Chronic history of hypertension   - Continue current medication regimen with Coreg  Diuretic therapy to be resumed on 06/05/2022  - Adjust management as indicated  - Outpatient follow-up per routine

## 2022-06-05 NOTE — ASSESSMENT & PLAN NOTE
- Patient with chronic history of V-tach status post ablation at [facility] as well as history of ischemic cardiomyopathy with ICD in place   - Patient did have episode of V-tach requiring defibrillation on 05/13/2022 per outpatient notes  - Consider inpatient Cardiology consultation if patient requires operative intervention for perioperative evaluation and risk assessment   - Continue home medication regimen including Amiodarone and Mexiletine   - No evidence that the patient's fall was cardiac related

## 2022-06-05 NOTE — ASSESSMENT & PLAN NOTE
- Acute right humeral shaft fracture, present on admission   - orthopedics consulted note appreciated  Patient elected non operative management  - continue coaptation splint and nonweightbearing status  - continue to monitor left upper extremity neurovascular exam   - Continue multimodal analgesic regimen   - Continue DVT prophylaxis  - PT and OT evaluation and treatment as indicated  - follow-up with orthopedics as an outpatient in 1 week

## 2022-06-05 NOTE — PLAN OF CARE
Problem: Potential for Falls  Goal: Patient will remain free of falls  Description: INTERVENTIONS:  - Educate patient/family on patient safety including physical limitations  - Instruct patient to call for assistance with activity   - Consult OT/PT to assist with strengthening/mobility   - Keep Call bell within reach  - Keep bed low and locked with side rails adjusted as appropriate  - Keep care items and personal belongings within reach  - Initiate and maintain comfort rounds  - Make Fall Risk Sign visible to staff  - Offer Toileting every 2 Hours, in advance of need  - Initiate/Maintain bed alarm  - Obtain necessary fall risk management equipment:  bed alarm  - Apply yellow socks and bracelet for high fall risk patients  - Consider moving patient to room near nurses station  Outcome: Progressing     Problem: MOBILITY - ADULT  Goal: Maintain or return to baseline ADL function  Description: INTERVENTIONS:  -  Assess patient's ability to carry out ADLs; assess patient's baseline for ADL function and identify physical deficits which impact ability to perform ADLs (bathing, care of mouth/teeth, toileting, grooming, dressing, etc )  - Assess/evaluate cause of self-care deficits   - Assess range of motion  - Assess patient's mobility; develop plan if impaired  - Assess patient's need for assistive devices and provide as appropriate  - Encourage maximum independence but intervene and supervise when necessary  - Involve family in performance of ADLs  - Assess for home care needs following discharge   - Consider OT consult to assist with ADL evaluation and planning for discharge  - Provide patient education as appropriate  Outcome: Progressing  Goal: Maintains/Returns to pre admission functional level  Description: INTERVENTIONS:  - Perform BMAT or MOVE assessment daily    - Set and communicate daily mobility goal to care team and patient/family/caregiver     - Collaborate with rehabilitation services on mobility goals if consulted  - Perform Range of Motion 4 times a day  - Reposition patient every 2 hours    - Dangle patient 4 times a day  - Stand patient 4 times a day  - Ambulate patient 4 times a day  - Out of bed to chair 4 times a day   - Out of bed for meals 4 times a day  - Out of bed for toileting  - Record patient progress and toleration of activity level   Outcome: Progressing     Problem: Prexisting or High Potential for Compromised Skin Integrity  Goal: Skin integrity is maintained or improved  Description: INTERVENTIONS:  - Identify patients at risk for skin breakdown  - Assess and monitor skin integrity  - Assess and monitor nutrition and hydration status  - Monitor labs   - Assess for incontinence   - Turn and reposition patient  - Assist with mobility/ambulation  - Relieve pressure over bony prominences  - Avoid friction and shearing  - Provide appropriate hygiene as needed including keeping skin clean and dry  - Evaluate need for skin moisturizer/barrier cream  - Collaborate with interdisciplinary team   - Patient/family teaching  - Consider wound care consult   Outcome: Progressing     Problem: PAIN - ADULT  Goal: Verbalizes/displays adequate comfort level or baseline comfort level  Description: Interventions:  - Encourage patient to monitor pain and request assistance  - Assess pain using appropriate pain scale  - Administer analgesics based on type and severity of pain and evaluate response  - Implement non-pharmacological measures as appropriate and evaluate response  - Consider cultural and social influences on pain and pain management  - Notify physician/advanced practitioner if interventions unsuccessful or patient reports new pain  Outcome: Progressing     Problem: INFECTION - ADULT  Goal: Absence or prevention of progression during hospitalization  Description: INTERVENTIONS:  - Assess and monitor for signs and symptoms of infection  - Monitor lab/diagnostic results  - Monitor all insertion sites, i e  indwelling lines, tubes, and drains  - Monitor endotracheal if appropriate and nasal secretions for changes in amount and color  - [place] appropriate cooling/warming therapies per order  - Administer medications as ordered  - Instruct and encourage patient and family to use good hand hygiene technique  - Identify and instruct in appropriate isolation precautions for identified infection/condition  Outcome: Progressing  Goal: Absence of fever/infection during neutropenic period  Description: INTERVENTIONS:  - Monitor WBC    Outcome: Progressing     Problem: DISCHARGE PLANNING  Goal: Discharge to home or other facility with appropriate resources  Description: INTERVENTIONS:  - Identify barriers to discharge w/patient and caregiver  - Arrange for needed discharge resources and transportation as appropriate  - Identify discharge learning needs (meds, wound care, etc )  - Arrange for interpretive services to assist at discharge as needed  - Refer to Case Management Department for coordinating discharge planning if the patient needs post-hospital services based on physician/advanced practitioner order or complex needs related to functional status, cognitive ability, or social support system  Outcome: Progressing

## 2022-06-05 NOTE — ASSESSMENT & PLAN NOTE
Lab Results   Component Value Date    EGFR 46 06/04/2022    EGFR 37 06/03/2022    CREATININE 1 46 (H) 06/04/2022    CREATININE 1 76 (H) 06/03/2022     - Patient with baseline history of CKD stage 3 with baseline creatinine appearing to be between 1 4 & 1 5   - Creatinine on presentation mildly elevated above baseline to 1 76  Creatinine improved to 1 46 by 06/04/2022  - Avoid nephrotoxic medications and hypotension  Home diuretic therapy to be resumed on 06/05/2022 with repeat BMP in the morning   - Monitor volume status with daily weights and accurate I/Os  - Continue to monitor electrolytes and renal function

## 2022-06-05 NOTE — PROGRESS NOTES
[facility]  Progress Note - Misty Campbell 1945, 77 y o  male MRN: [id number]  Unit/Bed#: S -01 Encounter: [id number]  Primary Care Provider: Berny Leavitt DO   Date and time admitted to hospital: 6/3/2022  4:36 PM    Fall  Assessment & Plan  - Status post fall with the below noted injuries  - Fall precautions  - Geriatric Medicine consultation for evaluation, medication review and recommendations   - PT and OT evaluation and treatment as indicated  - Case Management consultation for disposition planning  Ambulatory dysfunction  Assessment & Plan  - Patient with chronic ambulatory dysfunction, status post fall today  - Maintain fall precautions  - Up with assistance only  - Continue PT and OT evaluation and treatment as indicated  * Closed fracture of shaft of right humerus  Assessment & Plan  - Acute right humeral shaft fracture, present on admission   - Appreciate Orthopedic surgery evaluation and recommendations  Anticipate non-operative management  - Maintain NON-weightbearing status on the left upper extremity in splint   - Monitor left upper extremity neurovascular exam   - Continue multimodal analgesic regimen   - Continue DVT prophylaxis  - PT and OT evaluation and treatment as indicated  - Outpatient follow up with Orthopedic surgery for re-evaluation  CKD (chronic kidney disease)  Assessment & Plan  Lab Results   Component Value Date    EGFR 46 06/04/2022    EGFR 37 06/03/2022    CREATININE 1 46 (H) 06/04/2022    CREATININE 1 76 (H) 06/03/2022     - Patient with baseline history of CKD stage 3 with baseline creatinine appearing to be between 1 4 & 1 5   - Creatinine on presentation mildly elevated above baseline to 1 76  Creatinine improved to 1 46 by 06/04/2022  - Avoid nephrotoxic medications and hypotension    Home diuretic therapy to be resumed on 06/05/2022 with repeat BMP in the morning   - Monitor volume status with daily weights and accurate I/Os  - Continue to monitor electrolytes and renal function  V-tach [facility])  Assessment & Plan  - Patient with chronic history of V-tach status post ablation at [facility] as well as history of ischemic cardiomyopathy with ICD in place   - Patient did have episode of V-tach requiring defibrillation on 05/13/2022 per outpatient notes  - Consider inpatient Cardiology consultation if patient requires operative intervention for perioperative evaluation and risk assessment   - Continue home medication regimen including Amiodarone and Mexiletine   - No evidence that the patient's fall was cardiac related  Hypertension  Assessment & Plan  - Chronic history of hypertension   - Continue current medication regimen with Coreg  Diuretic therapy to be resumed on 06/05/2022  - Adjust management as indicated  - Outpatient follow-up per routine  Hyperlipidemia  Assessment & Plan  - Chronic history of hyperlipidemia   - Continue medication regimen   - Outpatient follow-up per routine  Disposition:  Continue current level of care while awaiting placement at a post acute care facility for rehab  Continue therapy evaluation and treatment as indicated  Case Management following for disposition planning  SUBJECTIVE:  Chief Complaint:  My right arm still hurts and feels like it is moving      Subjective:  Patient continues to have right upper arm pain and sensation of involuntary movements  His current medication regimen continues to help with the pain  He is tolerating his diet without nausea vomiting  He has no other new complaints today  He denies any numbness/weakness/tingling in his extremities  OBJECTIVE:   Vitals:   Temp:  [97 6 °F (36 4 °C)-98 4 °F (36 9 °C)] 98 4 °F (36 9 °C)  HR:  [60-62] 60  Resp:  [18] 18  BP: ()/(44-68) 98/68    Intake/Output:  I/O       06/03 0701 06/04 0700 06/04 0701 06/05 0700 06/05 0701  06/06 0700    P  O   240     Total Intake(mL/kg)  240 (2 1)     Urine (mL/kg/hr)  2 (0)     Total Output  2     Net  +238                 Nutrition: Diet Regular; Regular House  GI Proph/Bowel Reg:  On Colace and senna for bowel regimen  VTE Prophylaxis:Sequential compression device (Venodyne)  and Enoxaparin (Lovenox)     Physical Exam:   GENERAL APPEARANCE: Patient in no acute distress  HEENT: NC, healing posterior scalp laceration with staples in place and minimal tenderness; EOMs intact; Mucous membranes moist  CV: Regular rate and rhythm; no murmur/gallops/rubs appreciated  CHEST / LUNGS: Clear to auscultation; no wheezes/rales/rhonci  ABD: NABS; soft; non-distended; non-tender  :  Voiding spontaneously  EXT: +2 pulses bilaterally upper & lower extremities; significant bilateral lower extremity edema mildly increase compared to yesterday  Mild to moderate tenderness over the right upper arm with splints and overlying dressings in place as well as a sling  Patient does have soft and compressible compartment in the upper arm with intact neurovascular exam distally  NEURO: GCS 15; no focal neurologic deficits; neurovascularly intact  SKIN: Warm, dry and well perfused; no rash; no jaundice  Chronic venous stasis changes to the bilateral lower extremities  Invasive Devices  Report    Peripheral Intravenous Line  Duration           Peripheral IV 06/04/22 Left;Ventral (anterior) Forearm 1 day                      Lab Results: Results: I have personally reviewed all pertinent laboratory/tests results, BMP/CMP: No results found for: SODIUM, K, CL, CO2, ANIONGAP, BUN, CREATININE, GLUCOSE, CALCIUM, AST, ALT, ALKPHOS, PROT, BILITOT, EGFR and CBC: No results found for: WBC, HGB, HCT, MCV, PLT, ADJUSTEDWBC, MCH, MCHC, RDW, MPV, NRBC  Imaging/EKG Studies: I have personally reviewed pertinent reports       Other Studies: N/A    Betito Pizano PA-C  6/5/2022 12:27 PM

## 2022-06-05 NOTE — ASSESSMENT & PLAN NOTE
Lab Results   Component Value Date    EGFR 46 06/04/2022    EGFR 37 06/03/2022    CREATININE 1 46 (H) 06/04/2022    CREATININE 1 76 (H) 06/03/2022     - Patient with baseline history of CKD stage 3 with baseline creatinine appearing to be between 1 4 & 1 5   - Creatinine on presentation mildly elevated above baseline to 1 76  Now back to baseline at 1 48  - Avoid nephrotoxic medications and hypotension  Home diuretic therapy to be resumed on 06/05/2022 with repeat BMP in the morning   - Monitor volume status with daily weights and accurate I/Os  - Continue to monitor electrolytes and renal function

## 2022-06-06 PROBLEM — E87.1 HYPONATREMIA: Status: ACTIVE | Noted: 2022-06-06

## 2022-06-06 LAB
ANION GAP SERPL CALCULATED.3IONS-SCNC: 6 MMOL/L (ref 4–13)
BUN SERPL-MCNC: 37 MG/DL (ref 5–25)
CALCIUM SERPL-MCNC: 8.1 MG/DL (ref 8.4–10.2)
CHLORIDE SERPL-SCNC: 95 MMOL/L (ref 96–108)
CO2 SERPL-SCNC: 30 MMOL/L (ref 21–32)
CREAT SERPL-MCNC: 1.48 MG/DL (ref 0.6–1.3)
GFR SERPL CREATININE-BSD FRML MDRD: 45 ML/MIN/1.73SQ M
GLUCOSE SERPL-MCNC: 109 MG/DL (ref 65–140)
POTASSIUM SERPL-SCNC: 3.7 MMOL/L (ref 3.5–5.3)
SODIUM SERPL-SCNC: 131 MMOL/L (ref 135–147)

## 2022-06-06 PROCEDURE — 99222 1ST HOSP IP/OBS MODERATE 55: CPT | Performed by: NURSE PRACTITIONER

## 2022-06-06 PROCEDURE — 99232 SBSQ HOSP IP/OBS MODERATE 35: CPT | Performed by: PHYSICIAN ASSISTANT

## 2022-06-06 PROCEDURE — 97116 GAIT TRAINING THERAPY: CPT

## 2022-06-06 PROCEDURE — 97110 THERAPEUTIC EXERCISES: CPT

## 2022-06-06 PROCEDURE — 80048 BASIC METABOLIC PNL TOTAL CA: CPT | Performed by: PHYSICIAN ASSISTANT

## 2022-06-06 RX ADMIN — ESCITALOPRAM OXALATE 20 MG: 20 TABLET ORAL at 20:05

## 2022-06-06 RX ADMIN — GABAPENTIN 100 MG: 100 CAPSULE ORAL at 09:32

## 2022-06-06 RX ADMIN — MEXILETINE HYDROCHLORIDE 150 MG: 150 CAPSULE ORAL at 15:45

## 2022-06-06 RX ADMIN — OXYCODONE HYDROCHLORIDE 2.5 MG: 5 TABLET ORAL at 17:31

## 2022-06-06 RX ADMIN — ACETAMINOPHEN 975 MG: 325 TABLET ORAL at 05:35

## 2022-06-06 RX ADMIN — MEXILETINE HYDROCHLORIDE 150 MG: 150 CAPSULE ORAL at 20:05

## 2022-06-06 RX ADMIN — ATORVASTATIN CALCIUM 20 MG: 20 TABLET, FILM COATED ORAL at 15:45

## 2022-06-06 RX ADMIN — MEXILETINE HYDROCHLORIDE 150 MG: 150 CAPSULE ORAL at 09:34

## 2022-06-06 RX ADMIN — METHOCARBAMOL 250 MG: 500 TABLET, FILM COATED ORAL at 05:36

## 2022-06-06 RX ADMIN — HYDROMORPHONE HYDROCHLORIDE 0.2 MG: 0.2 INJECTION, SOLUTION INTRAMUSCULAR; INTRAVENOUS; SUBCUTANEOUS at 20:12

## 2022-06-06 RX ADMIN — ENOXAPARIN SODIUM 30 MG: 30 INJECTION SUBCUTANEOUS at 20:04

## 2022-06-06 RX ADMIN — AMIODARONE HYDROCHLORIDE 200 MG: 200 TABLET ORAL at 17:31

## 2022-06-06 RX ADMIN — METHOCARBAMOL 250 MG: 500 TABLET, FILM COATED ORAL at 17:32

## 2022-06-06 RX ADMIN — METHOCARBAMOL 250 MG: 500 TABLET, FILM COATED ORAL at 12:30

## 2022-06-06 RX ADMIN — ACETAMINOPHEN 975 MG: 325 TABLET ORAL at 23:53

## 2022-06-06 RX ADMIN — GABAPENTIN 100 MG: 100 CAPSULE ORAL at 15:45

## 2022-06-06 RX ADMIN — ENOXAPARIN SODIUM 30 MG: 30 INJECTION SUBCUTANEOUS at 09:34

## 2022-06-06 RX ADMIN — ACETAMINOPHEN 975 MG: 325 TABLET ORAL at 15:45

## 2022-06-06 RX ADMIN — GABAPENTIN 100 MG: 100 CAPSULE ORAL at 20:05

## 2022-06-06 RX ADMIN — AMIODARONE HYDROCHLORIDE 200 MG: 200 TABLET ORAL at 09:32

## 2022-06-06 RX ADMIN — CARVEDILOL 18.75 MG: 12.5 TABLET, FILM COATED ORAL at 15:45

## 2022-06-06 NOTE — PROGRESS NOTES
[facility]  Progress Note - Shweta Moya 1945, 77 y o  male MRN: [id number]  Unit/Bed#: S -01 Encounter: [id number]  Primary Care Provider: Johnathon Basilio DO   Date and time admitted to hospital: 6/3/2022  4:36 PM    Hyponatremia  Assessment & Plan  · Patient hyponatremic this morning 131, down from 136  · Likely associated with the restarting of patient's home torsemide  · Continue to monitor sodium levels daily  · I&O      Hyperlipidemia  Assessment & Plan  - Chronic history of hyperlipidemia   - Continue medication regimen   - Outpatient follow-up per routine  Hypertension  Assessment & Plan  - Chronic history of hypertension   - Continue current medication regimen with Coreg  Diuretic therapy to be resumed on 06/05/2022  - Adjust management as indicated  - Outpatient follow-up per routine  V-tach [facility])  Assessment & Plan  - Patient with chronic history of V-tach status post ablation at [facility] as well as history of ischemic cardiomyopathy with ICD in place   - Patient did have episode of V-tach requiring defibrillation on 05/13/2022 per outpatient notes  - Consider inpatient Cardiology consultation if patient requires operative intervention for perioperative evaluation and risk assessment   - Continue home medication regimen including Amiodarone and Mexiletine   - No evidence that the patient's fall was cardiac related  CKD (chronic kidney disease)  Assessment & Plan  Lab Results   Component Value Date    EGFR 46 06/04/2022    EGFR 37 06/03/2022    CREATININE 1 46 (H) 06/04/2022    CREATININE 1 76 (H) 06/03/2022     - Patient with baseline history of CKD stage 3 with baseline creatinine appearing to be between 1 4 & 1 5   - Creatinine on presentation mildly elevated above baseline to 1 76  Now back to baseline at 1 48  - Avoid nephrotoxic medications and hypotension    Home diuretic therapy to be resumed on 06/05/2022 with repeat BMP in the morning   - Monitor volume status with daily weights and accurate I/Os  - Continue to monitor electrolytes and renal function  Ambulatory dysfunction  Assessment & Plan  - Patient with chronic ambulatory dysfunction, status post fall  - Maintain fall precautions  - Up with assistance only  - Continue PT and OT evaluation and treatment as indicated  Fall  Assessment & Plan  - Status post fall with the below noted injuries  - Fall precautions  - Geriatric Medicine consultation for evaluation, medication review and recommendations   - PT and OT evaluation and treatment as indicated  - Case Management consultation for disposition planning  * Closed fracture of shaft of right humerus  Assessment & Plan  - Acute right humeral shaft fracture, present on admission   - orthopedics consulted note appreciated  Patient elected non operative management  - continue coaptation splint and nonweightbearing status  - continue to monitor left upper extremity neurovascular exam   - Continue multimodal analgesic regimen   - Continue DVT prophylaxis  - PT and OT evaluation and treatment as indicated  - follow-up with orthopedics as an outpatient in 1 week  Disposition: pending Placement    SUBJECTIVE:  Chief Complaint:  I feel better today    Subjective:  Patient states that he feels better today in comparison to yesterday  He describes feeling a crack in a shoulder yesterday and his shoulder pain largely resolving since that time  He denies any numbness or tingling in any of his extremities  Nursing staff does note that patient had a near fall overnight where he impacted his right knee  He denies any pain in the right knee and notes being able to fully range his right knee  Patient confirms that he feels well enough for discharge  Placement pending      OBJECTIVE:   Vitals:   Temp:  [97 6 °F (36 4 °C)-98 4 °F (36 9 °C)] 98 °F (36 7 °C)  HR:  [60-64] 64  Resp:  [16-18] 18  BP: ()/(40-74) 121/74    Intake/Output:  I/O       06/04 0701  06/05 0700 06/05 0701  06/06 0700    P  O  240 240    Total Intake(mL/kg) 240 (2 1) 240 (2 1)    Urine (mL/kg/hr) 2 (0) 350 (0 1)    Total Output 2 350    Net +238 -110               Nutrition: Diet Regular; Regular House  GI Proph/Bowel Reg: Colace  VTE Prophylaxis:Sequential compression device (Venodyne)  and Enoxaparin (Lovenox)     Physical Exam:   GENERAL APPEARANCE:  No acute distress  NEURO:  GCS 15  Light touch sensation intact throughout  HEENT:  Normocephalic, atraumatic  Neck supple  CV: RRR, +2 radial dorsalis pedis pulses, bilaterally  LUNGS:  Clear to auscultation, bilaterally  No wheezing, no rhonchi, no rales  No orthopnea  No tachypnea  GI:  Abdomen is soft nontender  :  Pelvis stable  MSK:  Right upper extremities in coaptation splint  Patient is able to move fingers and wrist and right upper extremity  Patient is able to fully range lower extremities  No lower extremity tenderness or joint effusion noted  Patient does have +2 lower extremity edema  SKIN:  Warm, dry  Invasive Devices  Report    Peripheral Intravenous Line  Duration           Peripheral IV 06/04/22 Left;Ventral (anterior) Forearm 1 day                      Lab Results:   Results: I have personally reviewed all pertinent laboratory/tests results, BMP/CMP:   Lab Results   Component Value Date    SODIUM 131 (L) 06/06/2022    K 3 7 06/06/2022    CL 95 (L) 06/06/2022    CO2 30 06/06/2022    BUN 37 (H) 06/06/2022    CREATININE 1 48 (H) 06/06/2022    CALCIUM 8 1 (L) 06/06/2022    EGFR 45 06/06/2022    and CBC: No results found for: WBC, HGB, HCT, MCV, PLT, ADJUSTEDWBC, MCH, MCHC, RDW, MPV, NRBC  Imaging/EKG Studies: I have personally reviewed pertinent reports       Other Studies: none

## 2022-06-06 NOTE — PLAN OF CARE
Problem: PHYSICAL THERAPY ADULT  Goal: Performs mobility at highest level of function for planned discharge setting  See evaluation for individualized goals  Description: Treatment/Interventions: Functional transfer training, LE strengthening/ROM, Elevations, Therapeutic exercise, Endurance training, Patient/family training, Equipment eval/education, Bed mobility, Gait training          See flowsheet documentation for full assessment, interventions and recommendations  Outcome: Progressing  Note: Prognosis: Fair  Problem List: Decreased strength, Decreased range of motion, Decreased endurance, Impaired balance, Decreased mobility, Decreased safety awareness, Obesity, Orthopedic restrictions, Pain  Assessment: pt began tx session lying supine in the bed and was agreeable to participate in PT intervention  pt continues to require mod Ax1 with HHA to complete a supine<>sit EOB transfer  pt was able to sit EOB and perform TE activities w/o LOB >10 minutes  progress was noted this tx session as pt was able to perform 3 STS from EOB w/ ora walker w/ a decrease level of assistance required min Ax1 w/ VC's for hand placement  pt was able to increase static standing balance by standing 3 minutes while holding a conversation w/o LOB  pt was able to increase ambulation distance to 30'x2 ora walker but continues to be limited by fatigue as pt required seated rest break after 30' of ambulation  post tx pt in recliner with call bell and all pt needs met  Continue to recommend post acute rehab services at the time of D/C in order to maximize pt functional independence and safety with all OOB mobility  PT Discharge Recommendation: Post acute rehabilitation services          See flowsheet documentation for full assessment

## 2022-06-06 NOTE — CASE MANAGEMENT
Case Management Progress Note    Patient name Salbador Royal  Location S /S -01 MRN [id number]  : 1945 Date 2022       LOS (days): 3  Geometric Mean LOS (GMLOS) (days):   Days to GMLOS:        OBJECTIVE:        Current admission status: Inpatient  Preferred Pharmacy:   [email or web address] [id number]  [address] [place] [address]  Phone: [phone] Fax: [phone]    Primary Care Provider: Trudy Carpenter DO    Primary Insurance: MEDICARE  Secondary Insurance: [employer]ET[employer]    PROGRESS NOTE:    Current accepting facilities:  [facility]  OO  S  [facility]    CM spoke with pt wife, Sami Jaime  She will review options with pt and notify CM of their decision  She is aware that  Other referrals are still pending  Reviewed pending referrals

## 2022-06-06 NOTE — PLAN OF CARE
Problem: Potential for Falls  Goal: Patient will remain free of falls  Description: INTERVENTIONS:  - Educate patient/family on patient safety including physical limitations  - Instruct patient to call for assistance with activity   - Consult OT/PT to assist with strengthening/mobility   - Keep Call bell within reach  - Keep bed low and locked with side rails adjusted as appropriate  - Keep care items and personal belongings within reach  - Initiate and maintain comfort rounds  - Make Fall Risk Sign visible to staff  - Offer Toileting every 2 Hours, in advance of need  - Initiate/Maintain bed alarm  - Obtain necessary fall risk management equipment:  bed alarm  - Apply yellow socks and bracelet for high fall risk patients  - Consider moving patient to room near nurses station  Outcome: Progressing     Problem: MOBILITY - ADULT  Goal: Maintain or return to baseline ADL function  Description: INTERVENTIONS:  -  Assess patient's ability to carry out ADLs; assess patient's baseline for ADL function and identify physical deficits which impact ability to perform ADLs (bathing, care of mouth/teeth, toileting, grooming, dressing, etc )  - Assess/evaluate cause of self-care deficits   - Assess range of motion  - Assess patient's mobility; develop plan if impaired  - Assess patient's need for assistive devices and provide as appropriate  - Encourage maximum independence but intervene and supervise when necessary  - Involve family in performance of ADLs  - Assess for home care needs following discharge   - Consider OT consult to assist with ADL evaluation and planning for discharge  - Provide patient education as appropriate  Outcome: Progressing  Goal: Maintains/Returns to pre admission functional level  Description: INTERVENTIONS:  - Perform BMAT or MOVE assessment daily    - Set and communicate daily mobility goal to care team and patient/family/caregiver     - Collaborate with rehabilitation services on mobility goals if consulted  - Perform Range of Motion 4 times a day  - Reposition patient every 2 hours    - Dangle patient 4 times a day  - Stand patient 4 times a day  - Ambulate patient 4 times a day  - Out of bed to chair 4 times a day   - Out of bed for meals 4 times a day  - Out of bed for toileting  - Record patient progress and toleration of activity level   Outcome: Progressing     Problem: Prexisting or High Potential for Compromised Skin Integrity  Goal: Skin integrity is maintained or improved  Description: INTERVENTIONS:  - Identify patients at risk for skin breakdown  - Assess and monitor skin integrity  - Assess and monitor nutrition and hydration status  - Monitor labs   - Assess for incontinence   - Turn and reposition patient  - Assist with mobility/ambulation  - Relieve pressure over bony prominences  - Avoid friction and shearing  - Provide appropriate hygiene as needed including keeping skin clean and dry  - Evaluate need for skin moisturizer/barrier cream  - Collaborate with interdisciplinary team   - Patient/family teaching  - Consider wound care consult   Outcome: Progressing     Problem: PAIN - ADULT  Goal: Verbalizes/displays adequate comfort level or baseline comfort level  Description: Interventions:  - Encourage patient to monitor pain and request assistance  - Assess pain using appropriate pain scale  - Administer analgesics based on type and severity of pain and evaluate response  - Implement non-pharmacological measures as appropriate and evaluate response  - Consider cultural and social influences on pain and pain management  - Notify physician/advanced practitioner if interventions unsuccessful or patient reports new pain  Outcome: Progressing     Problem: INFECTION - ADULT  Goal: Absence or prevention of progression during hospitalization  Description: INTERVENTIONS:  - Assess and monitor for signs and symptoms of infection  - Monitor lab/diagnostic results  - Monitor all insertion sites, i e  indwelling lines, tubes, and drains  - Monitor endotracheal if appropriate and nasal secretions for changes in amount and color  - [place] appropriate cooling/warming therapies per order  - Administer medications as ordered  - Instruct and encourage patient and family to use good hand hygiene technique  - Identify and instruct in appropriate isolation precautions for identified infection/condition  Outcome: Progressing  Goal: Absence of fever/infection during neutropenic period  Description: INTERVENTIONS:  - Monitor WBC    Outcome: Progressing     Problem: DISCHARGE PLANNING  Goal: Discharge to home or other facility with appropriate resources  Description: INTERVENTIONS:  - Identify barriers to discharge w/patient and caregiver  - Arrange for needed discharge resources and transportation as appropriate  - Identify discharge learning needs (meds, wound care, etc )  - Arrange for interpretive services to assist at discharge as needed  - Refer to Case Management Department for coordinating discharge planning if the patient needs post-hospital services based on physician/advanced practitioner order or complex needs related to functional status, cognitive ability, or social support system  Outcome: Progressing

## 2022-06-06 NOTE — QUICK NOTE
Patient's BP noted to be in 43O systolic, patient asymptomatic  Cardiac medoications with hold parameters still on hold  Trauma notified 
no

## 2022-06-06 NOTE — PHYSICAL THERAPY NOTE
PHYSICAL THERAPY NOTE          Patient Name: Alma Maya  [id number] Date: 6/6/2022 06/06/22 0915   PT Last Visit   PT Visit Date 06/06/22   Note Type   Note Type Treatment   Pain Assessment   Pain Assessment Tool 0-10   Pain Score No Pain   Pain Location/Orientation Location: Shoulder   Effect of Pain on Daily Activities limits activity tolerance w/ increased pain   Patient's Stated Pain Goal No pain   Hospital Pain Intervention(s) Repositioned; Ambulation/increased activity; Rest   Multiple Pain Sites No   Restrictions/Precautions   Weight Bearing Precautions Per Order Yes   RUE Weight Bearing Per Order NWB   Braces or Orthoses Splint  (R UE)   Other Precautions Chair Alarm; Bed Alarm;WBS;Fall Risk;Pain   General   Chart Reviewed Yes   Additional Pertinent History pre tx session pt /62, Sp02 92% and SD 60   Response to Previous Treatment Patient with no complaints from previous session  Family/Caregiver Present No   Cognition   Overall Cognitive Status Unable to assess   Arousal/Participation Alert; Cooperative;Responsive   Attention Within functional limits   Orientation Level Oriented X4   Memory Unable to assess   Following Commands Follows one step commands with increased time or repetition   Comments pt was pleasant and cooperative throughout tx session '   Subjective   Subjective pt was agreeable to participate in PT intervention   Bed Mobility   Supine to Sit 3  Moderate assistance   Additional items Assist x 1;HOB elevated; Bedrails; Increased time required;Verbal cues; Other;Comment  (HHA L UE)   Sit to Supine Unable to assess   Additional Comments pt seated OOB in the recliner post tx session with call bell, chair alarm activated , set up for breakfast and all pt needs met   Transfers   Sit to Stand 4  Minimal assistance   Additional items Assist x 1; Increased time required;Verbal cues  (w/ ora walker)   Stand to Sit 4  Minimal assistance   Additional items Assist x 1; Increased time required;Verbal cues  (w/ mayito walker)   Stand pivot 4  Minimal assistance   Additional items Assist x 1; Armrests; Increased time required;Verbal cues  (w/ mayito walker)   Additional Comments pt was able to perfrom 3STS from EOB w/ mayito walker all with min Ax1 in order to strengthen LE's and increase endurance and safety with all functional transfers   Ambulation/Elevation   Gait pattern Wide RODY; Decreased foot clearance; Short stride; Excessively slow   Gait Assistance 4  Minimal assist   Additional items Verbal cues   Assistive Device Mayito-walker   Distance 30'x2   Stair Management Assistance Not tested   Balance   Static Sitting Fair +   Dynamic Sitting Good   Static Standing Fair -   Dynamic Standing Fair -   Ambulatory Poor +   Activity Tolerance   Activity Tolerance Patient limited by fatigue   Exercises   Hip Adduction Sitting;20 reps;AROM; Bilateral  (pillow squeezes)   Knee AROM Long Arc Quad Sitting;20 reps;AROM; Bilateral   Ankle Pumps Sitting;20 reps;AROM; Bilateral   Marching Sitting;10 reps;AROM; Bilateral   Assessment   Prognosis Fair   Problem List Decreased strength;Decreased range of motion;Decreased endurance; Impaired balance;Decreased mobility; Decreased safety awareness; Obesity;Orthopedic restrictions;Pain   Assessment pt began tx session lying supine in the bed and was agreeable to participate in PT intervention  pt continues to require mod Ax1 with HHA to complete a supine<>sit EOB transfer  pt was able to sit EOB and perform TE activities w/o LOB >10 minutes  progress was noted this tx session as pt was able to perform 3 STS from EOB w/ mayito walker w/ a decrease level of assistance required min Ax1 w/ VC's for hand placement  pt was able to increase static standing balance by standing 3 minutes while holding a conversation w/o LOB   pt was able to increase ambulation distance to 30'x2 mayito walker but continues to be limited by fatigue as pt required seated rest break after 30' of ambulation  post tx pt in recliner with call bell and all pt needs met  Continue to recommend post acute rehab services at the time of D/C in order to maximize pt functional independence and safety with all OOB mobility  Goals   Patient Goals to be more active   STG Expiration Date 06/14/22   PT Treatment Day 2   Plan   Treatment/Interventions Functional transfer training;LE strengthening/ROM; Therapeutic exercise; Endurance training;Patient/family training;Equipment eval/education; Bed mobility;Gait training;Spoke to nursing   Progress Progressing toward goals   PT Frequency 4-6x/wk   Recommendation   PT Discharge Recommendation Post acute rehabilitation services   Additional Comments recommend ora walker for use with mobility   AM-PAC Basic Mobility Inpatient   Turning in Bed Without Bedrails 3   Lying on Back to Sitting on Edge of Flat Bed 2   Moving Bed to Chair 3   Standing Up From Chair 3   Walk in Room 3   Climb 3-5 Stairs 1   Basic Mobility Inpatient Raw Score 15   Basic Mobility Standardized Score 36 97   Highest Level Of Mobility   JH-HL Goal 4: Move to chair/commode   JH-HLM Achieved 7: Walk 25 feet or more   Education   Education Provided Mobility training;Assistive device   Patient Reinforcement needed   End of Consult   Patient Position at End of Consult Bed/Chair alarm activated; Bedside chair; All needs within reach   The patient's AM-PAC Basic Mobility Inpatient Short Form Raw Score is 15  A Raw score of less than or equal to 16 suggests the patient may benefit from discharge to post-acute rehabilitation services  Please also refer to the recommendation of the Physical Therapist for safe discharge planning       Faith Ordoñez, PTA

## 2022-06-06 NOTE — ASSESSMENT & PLAN NOTE
· Patient hyponatremic this morning 131, down from 136  · Likely associated with the restarting of patient's home torsemide    · Continue to monitor sodium levels daily  · I&O Quality 226: Preventive Care And Screening: Tobacco Use: Screening And Cessation Intervention: Patient screened for tobacco use and is an ex/non-smoker Detail Level: Detailed Quality 130: Documentation Of Current Medications In The Medical Record: Current Medications Documented Quality 110: Preventive Care And Screening: Influenza Immunization: Influenza Immunization Administered during Influenza season Quality 431: Preventive Care And Screening: Unhealthy Alcohol Use - Screening: Patient not identified as an unhealthy alcohol user when screened for unhealthy alcohol use using a systematic screening method

## 2022-06-06 NOTE — PLAN OF CARE
Problem: INFECTION - ADULT  Goal: Absence or prevention of progression during hospitalization  Description: INTERVENTIONS:  - Assess and monitor for signs and symptoms of infection  - Monitor lab/diagnostic results  - Monitor all insertion sites, i e  indwelling lines, tubes, and drains  - Monitor endotracheal if appropriate and nasal secretions for changes in amount and color  - [place] appropriate cooling/warming therapies per order  - Administer medications as ordered  - Instruct and encourage patient and family to use good hand hygiene technique  - Identify and instruct in appropriate isolation precautions for identified infection/condition  Outcome: Progressing     Problem: PAIN - ADULT  Goal: Verbalizes/displays adequate comfort level or baseline comfort level  Description: Interventions:  - Encourage patient to monitor pain and request assistance  - Assess pain using appropriate pain scale  - Administer analgesics based on type and severity of pain and evaluate response  - Implement non-pharmacological measures as appropriate and evaluate response  - Consider cultural and social influences on pain and pain management  - Notify physician/advanced practitioner if interventions unsuccessful or patient reports new pain  Outcome: Progressing     Problem: Prexisting or High Potential for Compromised Skin Integrity  Goal: Skin integrity is maintained or improved  Description: INTERVENTIONS:  - Identify patients at risk for skin breakdown  - Assess and monitor skin integrity  - Assess and monitor nutrition and hydration status  - Monitor labs   - Assess for incontinence   - Turn and reposition patient  - Assist with mobility/ambulation  - Relieve pressure over bony prominences  - Avoid friction and shearing  - Provide appropriate hygiene as needed including keeping skin clean and dry  - Evaluate need for skin moisturizer/barrier cream  - Collaborate with interdisciplinary team   - Patient/family teaching  - Consider wound care consult   Outcome: Progressing     Problem: MOBILITY - ADULT  Goal: Maintain or return to baseline ADL function  Description: INTERVENTIONS:  -  Assess patient's ability to carry out ADLs; assess patient's baseline for ADL function and identify physical deficits which impact ability to perform ADLs (bathing, care of mouth/teeth, toileting, grooming, dressing, etc )  - Assess/evaluate cause of self-care deficits   - Assess range of motion  - Assess patient's mobility; develop plan if impaired  - Assess patient's need for assistive devices and provide as appropriate  - Encourage maximum independence but intervene and supervise when necessary  - Involve family in performance of ADLs  - Assess for home care needs following discharge   - Consider OT consult to assist with ADL evaluation and planning for discharge  - Provide patient education as appropriate  Outcome: Progressing     Problem: Potential for Falls  Goal: Patient will remain free of falls  Description: INTERVENTIONS:  - Educate patient/family on patient safety including physical limitations  - Instruct patient to call for assistance with activity   - Consult OT/PT to assist with strengthening/mobility   - Keep Call bell within reach  - Keep bed low and locked with side rails adjusted as appropriate  - Keep care items and personal belongings within reach  - Initiate and maintain comfort rounds  - Make Fall Risk Sign visible to staff  - Apply yellow socks and bracelet for high fall risk patients  - Consider moving patient to room near nurses station  Outcome: Progressing

## 2022-06-06 NOTE — PROGRESS NOTES
Pt had near fall event in the bathroom on the evening of 6/5  Pt went to the bathroom with the assistance of PCA using four point cane and when he was turning around away from the toilet his right knee gave out and he lowered his knee to the floor, and than lowered left knee as well  He had difficult time getting up from the kneeling position  We required three people assistance to get him to sitting position on the chair  We used sit to stand to transfer pt from chair to bad since he felt his legs were week  Vital signs were stable: /74, WV 62, O2 94%  Trauma team came to evaluate pt at the bedside  Patient declines any pain or discomfort at that time

## 2022-06-06 NOTE — CONSULTS
Consultation - [address] 77 y o  male MRN: [id number]  Unit/Bed#: S -01 Encounter: [id number]      Assessment/Plan   Fall  S/p mechanical fall down 1 stair on the driveway  Fall precautions  Out of bed as tolerated  Early and frequent mobilization  PT/OT consulted  Will require discharge to short-term rehab when medically stable    Closed fracture of shaft of right humerus  Acute right humeral shaft fracture s/p fall  Orthopedics following, patient elected non operative management  Continue coaptation splint  Continue nonweightbearing to right upper extremity  Remains neurovascular intact  Follow-up with orthopedics outpatient    Hyponatremia  Sodium level on admission 137, sodium today 131  Of note patient recently restarted on torsemide  Encourage adequate p o  Hydration  Continue to trend sodium levels  If sodium levels continue to trend down recommend Nephrology consult  Management per primary team    Hypertension  Blood pressure on the softer side this morning, 99/48  Is currently on Coreg 18 75 mg b i d   And torsemide 40 mg daily  Avoid hypotension  Consider decreasing medications if patient remains with soft blood pressures  Management per primary team    V-tach  Patient with chronic history of vtach s/p ablation at [facility] as well as history of ischemic cardiomyopathy with ICD in place  Of note did have episode of V-tach requiring defibrillation on 05/13/2022  Continue amiodarone and mexiletine  Follow-up with cardiology as recommended    Frailty  Clinical frail score: 5 mild frailty   Most recent albumin 3 4 total protein 7 1 on 06/03/2022  Encourage adequate hydration and nutrition, including protein and meals  PT/OT consulted      Ambulatory dysfunction  At a baseline ambulates with cane  PT/OT following  Fall precautions  Out of bed as tolerated  Encourage early and frequent mobilization  Encourage adequate hydration and nutrition  Provide adequate pain management   Goal is to return home with wife  Continue with PT/OT for continued strength and balance training     Constipation  Is taking p r n  Oxycodone 2-3 times a day  No bowel movement since admitting to hospital  Is currently on senna 2 tablets daily and Colace b i d  Could add MiraLax p r n  Encourage adequate p o  Hydration        Home medication review  Aspirin 81 mg daily  Torsemide 40 mg b i d  Amiodarone 200 mg b i d  Coreg 18 75 mg b i d  Maurisio Martínez Atorvastatin 20 mg daily  Entresto 24-26mg 1 and half tablets b i d   Multivitamin  B complex  Vitamin D3      Personally confirmed with patient  History of Present Illness   Physician Requesting Consult: Janette Rodriguez MD  Reason for Consult / Principal Problem: fall  Hx and PE limited by: none  Additional history obtained from: Chart review and patient evaluation      HPI: Hieu Schroeder is a 77y o  year old male who presents from home status post mechanical fall  Patient states he was going outside and lost his balance on the last step  Found to have a closed fracture of shaft of right humerus  His comorbidities include hypertension, hyperlipidemia, CKD, V-tach, and ambulatory dysfunction  He lives at home with his wife  At a baseline uses a cane for ambulation  He has had 1 other fall in the last several months from where he also lost his balance  He does use contacts and hearing aids, although hearing aids have not been working recently  He is still driving  Him and his wife both manage the home finances, cooking, and cleaning  He manages all his own medications, he sets up weekly medication boxes  No issues with car accidents or tickets  He is continent of both bowel and bladder  No issues with anxiety or depression  His appetite has been good, no issues with weight loss  No issues with memory reported by patient  Upon examination patient was out of bed in the chair, resting    He is being seen today for a direct site consult He was alert and oriented x3 on exam   He was calm cooperative throughout exam   He said he is feeling well and offered no acute complaints  His pain is well controlled  He is sleeping well at night  Inpatient consult to Gerontology  Consult performed by: ANDREW Zayas  Consult ordered by: ANDREW Nevarez          Review of Systems   Constitutional: Negative for activity change, appetite change, chills, fatigue and fever  Eyes: Negative for visual disturbance  Respiratory: Negative for cough and shortness of breath  Cardiovascular: Negative for chest pain and palpitations  Gastrointestinal: Positive for constipation  Negative for abdominal distention, abdominal pain, diarrhea, nausea and vomiting  Genitourinary: Negative for difficulty urinating, dysuria, frequency, hematuria and urgency  Musculoskeletal: Positive for arthralgias and gait problem  Negative for back pain  Skin: Negative for color change and rash  Neurological: Positive for weakness  Negative for dizziness, seizures, syncope and light-headedness         Historical Information   Past Medical History:   Diagnosis Date    Bilateral hearing loss     CHF (congestive heart failure) (HCC)     Depression     Frequent falls     Hypertension     ICD (implantable cardioverter-defibrillator) in place     Ischemic cardiomyopathy     Morbid obesity ([facility] [address] )     Sleep apnea     Ventricular tachycardia ([facility] [address] )      Past Surgical History:   Procedure Laterality Date    CARDIAC ELECTROPHYSIOLOGY STUDY AND ABLATION      Ventricular tachycardia ablation at [address] by Dr Zayda Zimmerman History   Social History     Substance and Sexual Activity   Alcohol Use Not Currently     Social History     Substance and Sexual Activity   Drug Use Not Currently     Social History     Tobacco Use   Smoking Status Never Smoker   Smokeless Tobacco Never Used     Family History:   Family History   Problem Relation Age of Onset    Heart attack Father        Meds/Allergies   all current active meds have been reviewed    No Known Allergies    Objective     Intake/Output Summary (Last 24 hours) at 6/6/2022 1334  Last data filed at 6/6/2022 0931  Gross per 24 hour   Intake 360 ml   Output 350 ml   Net 10 ml     Invasive Devices  Report    Peripheral Intravenous Line  Duration           Peripheral IV 06/04/22 Left;Ventral (anterior) Forearm 2 days                Physical Exam  Vitals reviewed  Constitutional:       General: He is not in acute distress  Appearance: He is well-developed  He is obese  He is not ill-appearing  HENT:      Head: Normocephalic and atraumatic  Mouth/Throat:      Mouth: Mucous membranes are moist       Pharynx: No oropharyngeal exudate or posterior oropharyngeal erythema  Cardiovascular:      Rate and Rhythm: Normal rate and regular rhythm  Heart sounds: No murmur heard  Pulmonary:      Effort: Pulmonary effort is normal  No respiratory distress  Breath sounds: Normal breath sounds  Abdominal:      General: Bowel sounds are normal  There is no distension  Palpations: Abdomen is soft  Tenderness: There is no abdominal tenderness  Musculoskeletal:         General: Signs of injury (sling and splint on R arm) present  Cervical back: Neck supple  Right lower leg: Edema present  Left lower leg: Edema present  Skin:     General: Skin is warm and dry  Neurological:      General: No focal deficit present  Mental Status: He is alert and oriented to person, place, and time  Mental status is at baseline  Cranial Nerves: No cranial nerve deficit  Motor: Weakness present        Gait: Gait abnormal    Psychiatric:         Mood and Affect: Mood normal          Behavior: Behavior normal          Lab Results:   I have personally reviewed pertinent lab results including the following:  -BMP, CBC, and magnesium level    I have personally reviewed the following imaging study reports in PACS:  -x-ray humerus right, x-ray of chest, CT of upper extremity, CT spine, CT of head      Therapies:   PT: consulted  OT: consulted    VTE Prophylaxis: Enoxaparin (Lovenox)    Code Status: Level 1 - Full Code  Advance Directive and Living Will:     n/a  Power of :  kathrin Mahoney   POLST:  n/a    Family and Social Support:   Living Arrangements: Lives w/ Spouse/significant other  Support Systems: Spouse/significant other  Type of Current Residence: [facility]  Discharge planning discussed with[de-identified] pt and left VM for wifeChino  Freedom of Choice: Yes      Goals of Care: return home with wife

## 2022-06-06 NOTE — CASE MANAGEMENT
Case Management Assessment & Discharge Planning Note    Patient name Dano Horne  Location S /S -42 MRN [id number]  : 1945 Date 2022       Current Admission Date: 6/3/2022  Current Admission Diagnosis:Closed fracture of shaft of right humerus   Patient Active Problem List    Diagnosis Date Noted    Hyponatremia 2022    Fall 2022    Ambulatory dysfunction 2022    Closed fracture of shaft of right humerus 2022    CKD (chronic kidney disease) 2022    V-tach ([address] ) 2022    Hypertension 2022    Hyperlipidemia 2022      LOS (days): 3  Geometric Mean LOS (GMLOS) (days):   Days to GMLOS:     OBJECTIVE:    Risk of Unplanned Readmission Score: 15 55         Current admission status: Inpatient       Preferred Pharmacy:   [facility]/pharmacy [address] [place] [address]  Phone: [phone] Fax: [phone]    Primary Care Provider: Gari Rubinstein, DO    Primary Insurance: MEDICARE  Secondary Insurance: AETNA    ASSESSMENT:  Amber 26 Proxies    There are no active Health Care Proxies on file  Readmission Root Cause  30 Day Readmission: No    Patient Information  Admitted from[de-identified] Home  Mental Status: Alert  During Assessment patient was accompanied by: Not accompanied during assessment  Assessment information provided by[de-identified] Patient  Primary Caregiver: Self  Support Systems: Spouse/significant other  [place] of Residence: [address] [place] [address] do you live in?: [facility] entry access options   Select all that apply : Stairs  Number of steps to enter home : 3 (through garage and 5 CLAUDIA through front)  Type of Current Residence: [facility]  Living Arrangements: Lives w/ Spouse/significant other  Is patient a ?: No    Activities of Daily Living Prior to Admission  Functional Status: Independent  Completes ADLs independently?: Yes  Does patient use assisted devices?: Yes  Assisted Devices (DME) used: Straight Cane  Does patient currently own DME?: Yes  What DME does the patient currently own?: Mat Prader, Wheelchair, Straight Cane  Does patient have a history of Outpatient Therapy (PT/OT)?: Yes  Does the patient have a history of Short-Term Rehab?: No  Does patient have a history of HHC?: No  Does patient currently have [facility]?: No    Patient Information Continued  Income Source: Pension/senior living  Does patient receive dialysis treatments?: No  Does patient have a history of substance abuse?: No  Does patient have a history of Mental Health Diagnosis?: Yes  Is patient receiving treatment for mental health?: Yes (PCP manages medication)  Has patient received inpatient treatment related to mental health in the last 2 years?: No    Means of Transportation  Means of Transport to Appts[de-identified] Drives Self  In the past 12 months, has lack of transportation kept you from medical appointments or from getting medications?: No  In the past 12 months, has lack of transportation kept you from meetings, work, or from getting things needed for daily living?: No  Was application for public transport provided?: N/A    DISCHARGE DETAILS:    Discharge planning discussed with[de-identified] pt and left VM for wife, Clarence Lea of Choice: Yes  Comments - Freedom of Choice: STR - agreeable to blanket referrals    Other Referral/Resources/Interventions Provided:  Interventions: Short Term Rehab  Referral Comments: Therapy recs for STR  Pt is agreeable to same and has no preference  Pt reports he is vaccinated for COVID and has first booster  Pt requested CM also update his wife, Geo Melgoza  CM Left VM for Ricardo Horn requesting return call  Referrals to STR in [place]      Treatment Team Recommendation: Short Term Rehab  Discharge Destination Plan[de-identified] Short Term Rehab

## 2022-06-07 VITALS
OXYGEN SATURATION: 98 % | WEIGHT: 257.72 LBS | RESPIRATION RATE: 16 BRPM | HEIGHT: 64 IN | TEMPERATURE: 98.1 F | DIASTOLIC BLOOD PRESSURE: 53 MMHG | HEART RATE: 60 BPM | SYSTOLIC BLOOD PRESSURE: 91 MMHG | BODY MASS INDEX: 44 KG/M2

## 2022-06-07 LAB
ANION GAP SERPL CALCULATED.3IONS-SCNC: 9 MMOL/L (ref 4–13)
BUN SERPL-MCNC: 34 MG/DL (ref 5–25)
CALCIUM SERPL-MCNC: 8.5 MG/DL (ref 8.4–10.2)
CHLORIDE SERPL-SCNC: 94 MMOL/L (ref 96–108)
CO2 SERPL-SCNC: 28 MMOL/L (ref 21–32)
CREAT SERPL-MCNC: 1.17 MG/DL (ref 0.6–1.3)
GFR SERPL CREATININE-BSD FRML MDRD: 60 ML/MIN/1.73SQ M
GLUCOSE SERPL-MCNC: 104 MG/DL (ref 65–140)
POTASSIUM SERPL-SCNC: 3.4 MMOL/L (ref 3.5–5.3)
SODIUM SERPL-SCNC: 131 MMOL/L (ref 135–147)

## 2022-06-07 PROCEDURE — 97530 THERAPEUTIC ACTIVITIES: CPT

## 2022-06-07 PROCEDURE — 97110 THERAPEUTIC EXERCISES: CPT

## 2022-06-07 PROCEDURE — 99238 HOSP IP/OBS DSCHRG MGMT 30/<: CPT | Performed by: PHYSICIAN ASSISTANT

## 2022-06-07 PROCEDURE — 97116 GAIT TRAINING THERAPY: CPT

## 2022-06-07 PROCEDURE — NC001 PR NO CHARGE: Performed by: PHYSICIAN ASSISTANT

## 2022-06-07 PROCEDURE — 80048 BASIC METABOLIC PNL TOTAL CA: CPT | Performed by: NURSE PRACTITIONER

## 2022-06-07 PROCEDURE — 99232 SBSQ HOSP IP/OBS MODERATE 35: CPT | Performed by: NURSE PRACTITIONER

## 2022-06-07 RX ORDER — METHOCARBAMOL 500 MG/1
250 TABLET, FILM COATED ORAL EVERY 6 HOURS SCHEDULED
Qty: 45 TABLET | Refills: 0
Start: 2022-06-07

## 2022-06-07 RX ORDER — SENNOSIDES 8.6 MG
17.2 TABLET ORAL DAILY
Refills: 0
Start: 2022-06-08 | End: 2022-06-09 | Stop reason: ALTCHOICE

## 2022-06-07 RX ORDER — OXYCODONE HYDROCHLORIDE 5 MG/1
TABLET ORAL
Qty: 20 TABLET | Refills: 0 | Status: SHIPPED | OUTPATIENT
Start: 2022-06-07

## 2022-06-07 RX ORDER — ACETAMINOPHEN 325 MG/1
975 TABLET ORAL EVERY 8 HOURS
Refills: 0
Start: 2022-06-07

## 2022-06-07 RX ORDER — GABAPENTIN 100 MG/1
100 CAPSULE ORAL 3 TIMES DAILY
Refills: 0
Start: 2022-06-07

## 2022-06-07 RX ORDER — POTASSIUM CHLORIDE 20 MEQ/1
20 TABLET, EXTENDED RELEASE ORAL ONCE
Status: COMPLETED | OUTPATIENT
Start: 2022-06-07 | End: 2022-06-07

## 2022-06-07 RX ORDER — DOCUSATE SODIUM 100 MG/1
100 CAPSULE, LIQUID FILLED ORAL 2 TIMES DAILY
Refills: 0
Start: 2022-06-07

## 2022-06-07 RX ADMIN — OXYCODONE HYDROCHLORIDE 2.5 MG: 5 TABLET ORAL at 11:22

## 2022-06-07 RX ADMIN — TORSEMIDE 40 MG: 20 TABLET ORAL at 08:39

## 2022-06-07 RX ADMIN — ENOXAPARIN SODIUM 30 MG: 30 INJECTION SUBCUTANEOUS at 08:38

## 2022-06-07 RX ADMIN — SACUBITRIL AND VALSARTAN 1 TABLET: 24; 26 TABLET, FILM COATED ORAL at 08:39

## 2022-06-07 RX ADMIN — CARVEDILOL 18.75 MG: 12.5 TABLET, FILM COATED ORAL at 08:40

## 2022-06-07 RX ADMIN — GABAPENTIN 100 MG: 100 CAPSULE ORAL at 08:35

## 2022-06-07 RX ADMIN — DOCUSATE SODIUM 100 MG: 100 CAPSULE, LIQUID FILLED ORAL at 08:35

## 2022-06-07 RX ADMIN — AMIODARONE HYDROCHLORIDE 200 MG: 200 TABLET ORAL at 08:34

## 2022-06-07 RX ADMIN — POTASSIUM CHLORIDE 20 MEQ: 1500 TABLET, EXTENDED RELEASE ORAL at 10:40

## 2022-06-07 RX ADMIN — METHOCARBAMOL 250 MG: 500 TABLET, FILM COATED ORAL at 05:24

## 2022-06-07 RX ADMIN — METHOCARBAMOL 250 MG: 500 TABLET, FILM COATED ORAL at 11:37

## 2022-06-07 RX ADMIN — ACETAMINOPHEN 975 MG: 325 TABLET ORAL at 08:33

## 2022-06-07 RX ADMIN — MEXILETINE HYDROCHLORIDE 150 MG: 150 CAPSULE ORAL at 08:36

## 2022-06-07 NOTE — DISCHARGE SUMMARY
[facility]  Discharge- Chante Barillas 1945, 77 y o  male MRN: [id number]  Unit/Bed#: S -01 Encounter: [id number]  Primary Care Provider: Princess Oni DO   Date and time admitted to hospital: 6/3/2022  4:36 PM    * Closed fracture of shaft of right humerus  Assessment & Plan  - Acute right humeral shaft fracture, present on admission   - orthopedics consulted note appreciated  Patient elected non operative management  - continue coaptation splint and nonweightbearing status  - continue to monitor left upper extremity neurovascular exam   - Continue multimodal analgesic regimen   - Continue DVT prophylaxis  - PT and OT evaluation and treatment as indicated  - follow-up with orthopedics as an outpatient in 1 week  Hyponatremia  Assessment & Plan  · Patient with hyponatremia down to 131  · Likely associated with the restarting of patient's home torsemide  · F/u with PCP in 1 week with repeat BMP    Hyperlipidemia  Assessment & Plan  - Chronic history of hyperlipidemia   - Continue medication regimen   - Outpatient follow-up per routine  Hypertension  Assessment & Plan  - Chronic history of hypertension   - Continue current medication regimen with Coreg  Diuretic therapy resumed on 06/05/2022   - Outpatient follow-up per routine  V-tach [facility])  Assessment & Plan  - Patient with chronic history of V-tach status post ablation at [facility] as well as history of ischemic cardiomyopathy with ICD in place   - Patient did have episode of V-tach requiring defibrillation on 05/13/2022 per outpatient notes  - Consider inpatient Cardiology consultation if patient requires operative intervention for perioperative evaluation and risk assessment   - Continue home medication regimen including Amiodarone and Mexiletine   - No evidence that the patient's fall was cardiac related      CKD (chronic kidney disease)  Assessment & Plan  Lab Results   Component Value Date    EGFR 45 06/06/2022    EGFR 46 06/04/2022    EGFR 37 06/03/2022    CREATININE 1 48 (H) 06/06/2022    CREATININE 1 46 (H) 06/04/2022    CREATININE 1 76 (H) 06/03/2022     - Patient with baseline history of CKD stage 3 with baseline creatinine appearing to be between 1 4 & 1 5   - Creatinine on presentation mildly elevated above baseline to 1 76  Now back to baseline at 1 48  - Avoid nephrotoxic medications and hypotension  Home diuretic therapy resumed on 06/05/2022  - Monitor volume status with daily weights and accurate I/Os  - Continue to monitor electrolytes and renal function  Ambulatory dysfunction  Assessment & Plan  - Patient with chronic ambulatory dysfunction, status post fall  - Maintain fall precautions  - Up with assistance only  - Continue PT and OT evaluation and treatment as indicated  Fall  Assessment & Plan  - Status post fall with the below noted injuries  - Fall precautions  - Geriatric Medicine consultation appreciated  - PT and OT evaluation and treatment as indicated  Recommending inpatient rehab  - Case Management consultation for disposition planning  Discharge to [facility] today  Medical Problems             Resolved Problems  Date Reviewed: 6/7/2022   None                 Admission Date:   Admission Orders (From admission, onward)     Ordered        06/03/22 1749  Inpatient Admission  Once                        Admitting Diagnosis: Weakness [R53 1]    HPI: As documented by Dorene Zavaleta PA-C who evaluated the patient on admission, "Misty Campbell is a 77 y o  male who presents with right upper arm pain  He experienced a mechanical fall down a step on to driveway    He did strike the back of his head, but denied losing consciousness "    Procedures Performed:   Orders Placed This Encounter   Procedures    Fast Ultrasound    Laceration repair    Pre-Procedural Sedation    Procedural Sedation    Splint application       Summary of Hospital Course: Patient was placed on the trauma service s/p fall when he sustained a right humerus fracture  Orthopedics discussed surgical vs non surgical options with the patient  He opted for non operative management in a splint  It was explained to him that surgery may ultimately be required if there is displacement at follow up  He is to maintain NWB status to the RUE and f/u with orthopedics in 1 week, Dr Roya Mcfarland  His pain was controlled  He was up and ambulatory with PT/OT  He had an SUSAN during his admission which was treated with gentle IVF hydration and holding his diuretic  This resolved and his diuretic was resumed on 6/5  His sodium was low at 131 but remained stable at time of discharge at 131  He is medically stable for discharge to rehab today, as recommended by PT/OT  He will be discharged to [facility]  Significant Findings, Care, Treatment and Services Provided:   XR humerus right    Result Date: 6/6/2022  Impression: No acute cardiopulmonary disease within limitations of supine imaging  Displaced, angulated right mid humeral shaft fracture  Workstation performed: [id number]     XR humerus right    Result Date: 6/4/2022  Impression: Improved alignment in a fracture of the mid right humeral shaft  Workstation performed: [id number]     TRAUMA - CT head wo contrast    Result Date: 6/3/2022  Impression: No acute intracranial abnormality  Posterior occipital scalp contusion/laceration without calvarial fracture  Workstation performed: [id number]     TRAUMA - CT spine cervical wo contrast    Result Date: 6/3/2022  Impression: No cervical spine fracture or traumatic malalignment  I personally discussed this study as well as results of head CT with Dr Toby Palafox on 6/3/2022 at 5:20 PM   Workstation performed: [id number]     XR chest 1 view    Result Date: 6/6/2022  Impression: No acute cardiopulmonary disease within limitations of supine imaging  Displaced, angulated right mid humeral shaft fracture  Workstation performed: [id number]     XR Trauma multiple (SLB/SLRA trauma bay ONLY)    Result Date: 6/3/2022  Impression: No acute cardiopulmonary disease within limitations of supine imaging  Displaced, angulated right mid humeral shaft fracture  Workstation performed: [id number]     CT upper extremity wo contrast right    Result Date: 6/3/2022  Impression: Displaced and angulated right mid humeral shaft fracture  Negative for proximal right humeral fracture  Limited evaluation of the distal humerus  If there is tenderness in this region, consider dedicated plain film imaging of the elbow  Workstation performed: [id number]       Complications: none    Condition at Discharge: good         Discharge instructions/Information to patient and family:   See after visit summary for information provided to patient and family  Provisions for Follow-Up Care:  See after visit summary for information related to follow-up care and any pertinent home health orders  PCP: Berny Leavitt DO    Disposition: Home    Planned Readmission: No    Discharge Statement   I spent 25 minutes discharging the patient  This time was spent on the day of discharge  I had direct contact with the patient on the day of discharge  Additional documentation is required if more than 30 minutes were spent on discharge  Discharge Medications:  See after visit summary for reconciled discharge medications provided to patient and family

## 2022-06-07 NOTE — ASSESSMENT & PLAN NOTE
- Status post fall with the below noted injuries  - Fall precautions  - Geriatric Medicine consultation appreciated  - PT and OT evaluation and treatment as indicated  - Case Management consultation for disposition planning

## 2022-06-07 NOTE — ASSESSMENT & PLAN NOTE
· Patient with hyponatremia down to 131  · Likely associated with the restarting of patient's home torsemide    · Continue to monitor sodium levels daily, am bmp pending  · I&O

## 2022-06-07 NOTE — PHYSICAL THERAPY NOTE
PHYSICAL THERAPY NOTE          Patient Name: Dayan Escobar  [id number] Date: 6/7/2022 06/07/22 0840   PT Last Visit   PT Visit Date 06/07/22   Note Type   Note Type Treatment   Pain Assessment   Pain Assessment Tool 0-10   Pain Score 4   Pain Location/Orientation Orientation: Right;Location: Arm   Pain Onset/Description Descriptor: Throbbing   Effect of Pain on Daily Activities limits activity tolerance   Patient's Stated Pain Goal No pain   Hospital Pain Intervention(s) Repositioned; Ambulation/increased activity; Elevated; Emotional support; Rest   Multiple Pain Sites No   Restrictions/Precautions   Weight Bearing Precautions Per Order Yes   RUE Weight Bearing Per Order NWB   Braces or Orthoses Splint  (R UE)   Other Precautions WBS; Chair Alarm; Bed Alarm; Fall Risk;Pain   General   Chart Reviewed Yes   Response to Previous Treatment Patient with no complaints from previous session  Family/Caregiver Present No   Cognition   Overall Cognitive Status Unable to assess   Arousal/Participation Alert; Responsive; Cooperative   Attention Within functional limits   Orientation Level Oriented X4   Memory Unable to assess   Following Commands Follows one step commands with increased time or repetition   Comments pt was pleasant and cooperative throughout tx session   Subjective   Subjective pt was agreeable and motivated to participate in PT intervention   Bed Mobility   Supine to Sit 4  Minimal assistance   Additional items Assist x 1;HOB elevated; Bedrails; Increased time required;Verbal cues; Other  (HHA)   Sit to Supine Unable to assess   Additional Comments pt seated OOB in the recliner post tx sesion with call bell, set up for breakfast and all pt needs met   Transfers   Sit to Stand 4  Minimal assistance   Additional items Assist x 1; Increased time required;Verbal cues  (ora walker)   Stand to Sit 4  Minimal assistance   Additional items Assist x 1; Armrests; Increased time required  (mayito walker)   Stand pivot 4  Minimal assistance   Additional items Assist x 1; Armrests; Increased time required;Verbal cues  (mayito walker)   Additional Comments pt continues to require min Ax1 for all functional transfers w/ mayito walker for safety and balance   Ambulation/Elevation   Gait pattern Wide RODY; Decreased foot clearance; Short stride; Excessively slow   Gait Assistance 4  Minimal assist   Additional items Assist x 1;Verbal cues   Assistive Device Mayito-walker   Distance 60'x1 mayito walker   Stair Management Assistance 1  Dependent   Additional items Assist x 1;Verbal cues; Tactile cues; Increased time required   Stair Management Technique One rail L;Step to pattern; Foreward;Backward   Number of Stairs 0   Ambulation/Elevation Additional Comments (S)  pt was able to increase ambulation distance and activity tolerance in todays tx session compared to previous tx session  pt was unable to complete 1 step in todays tx session as pt was unable to advance foot on top of step w/ 2 attempts   Balance   Static Sitting Fair +   Dynamic Sitting Fair   Static Standing Fair -   Dynamic Standing Fair -   Ambulatory Poor +   Activity Tolerance   Activity Tolerance Patient limited by fatigue   Nurse Made Aware Spoke to RN Gayla Felty   Exercises   Hip Abduction Sitting;20 reps;AROM; Bilateral   Hip Adduction Sitting;20 reps;AROM; Bilateral   Knee AROM Long Arc Quad Sitting;20 reps;AROM; Bilateral   Ankle Pumps Sitting;20 reps;AROM; Bilateral   Marching Sitting;20 reps;AROM; Bilateral   Assessment   Prognosis Fair   Problem List Decreased strength;Decreased range of motion;Decreased endurance; Impaired balance;Decreased mobility; Decreased safety awareness; Obesity;Orthopedic restrictions;Pain   Assessment pt began tx session lying supine in the bed and was agreeable to participate in Pt intervention   progress was noted this tx session as pt was able to perform a supine,>sit EOB transfer w/ a decrease in level of assistancerequired compared to previous tx sessions min Ax1 w/ HHA to pull up into a seated EOB position  pt was able to sit EOB and perfrorm all TE activities w/o LOB >10 minutes  pt was able to increase ambulation distance to 60'x1 ora walker w/o LOB but required VC's for ora walker placement  pt was educated on all safe stair trial strategies prior to attempting stairs  pt was unable to complete 1 step in todays tx session aspt was unable to advance initial foot on to step as pt tried twice once with L foot and once with R foot  Continue to recommend post acute rehab services at the time of D/C in order to maximize pt functional independence and safety w/ all OOB mobility  post tx pt in recliner with call bell and all pt needs met   Goals   Patient Goals to continue to walk more   STG Expiration Date 06/14/22   PT Treatment Day 3   Plan   Treatment/Interventions Functional transfer training;LE strengthening/ROM; Therapeutic exercise; Endurance training;Patient/family training;Equipment eval/education; Bed mobility;Gait training;Spoke to nursing   Progress Progressing toward goals   PT Frequency 4-6x/wk   Recommendation   PT Discharge Recommendation Post acute rehabilitation services   Additional Comments recommend ora walker w/ all mobility   AM-PAC Basic Mobility Inpatient   Turning in Bed Without Bedrails 3   Lying on Back to Sitting on Edge of Flat Bed 3   Moving Bed to Chair 3   Standing Up From Chair 3   Walk in Room 3   Climb 3-5 Stairs 1   Basic Mobility Inpatient Raw Score 16   Basic Mobility Standardized Score 38 32   Highest Level Of Mobility   JH-HLM Goal 5: Stand one or more mins   JH-HLM Achieved 7: Walk 25 feet or more   Education   Education Provided Mobility training;Assistive device; Other  (stair trials)   Patient Demonstrates acceptance/verbal understanding   End of Consult   Patient Position at End of Consult Bedside chair; All needs within reach;Bed/Chair alarm activated The patient's AM-PAC Basic Mobility Inpatient Short Form Raw Score is 16  A Raw score of less than or equal to 16 suggests the patient may benefit from discharge to post-acute rehabilitation services  Please also refer to the recommendation of the Physical Therapist for safe discharge planning       Christina Felton PTA

## 2022-06-07 NOTE — ASSESSMENT & PLAN NOTE
- Chronic history of hypertension   - Continue current medication regimen with Coreg  Diuretic therapy resumed on 06/05/2022   - Outpatient follow-up per routine

## 2022-06-07 NOTE — PLAN OF CARE
Problem: Potential for Falls  Goal: Patient will remain free of falls  Description: INTERVENTIONS:  - Educate patient/family on patient safety including physical limitations  - Instruct patient to call for assistance with activity   - Consult OT/PT to assist with strengthening/mobility   - Keep Call bell within reach  - Keep bed low and locked with side rails adjusted as appropriate  - Keep care items and personal belongings within reach  - Initiate and maintain comfort rounds  - Make Fall Risk Sign visible to staff  - Apply yellow socks and bracelet for high fall risk patients  - Consider moving patient to room near nurses station  Outcome: Adequate for Discharge     Problem: Prexisting or High Potential for Compromised Skin Integrity  Goal: Skin integrity is maintained or improved  Description: INTERVENTIONS:  - Identify patients at risk for skin breakdown  - Assess and monitor skin integrity  - Assess and monitor nutrition and hydration status  - Monitor labs   - Assess for incontinence   - Turn and reposition patient  - Assist with mobility/ambulation  - Relieve pressure over bony prominences  - Avoid friction and shearing  - Provide appropriate hygiene as needed including keeping skin clean and dry  - Evaluate need for skin moisturizer/barrier cream  - Collaborate with interdisciplinary team   - Patient/family teaching  - Consider wound care consult   Outcome: Adequate for Discharge     Problem: PAIN - ADULT  Goal: Verbalizes/displays adequate comfort level or baseline comfort level  Description: Interventions:  - Encourage patient to monitor pain and request assistance  - Assess pain using appropriate pain scale  - Administer analgesics based on type and severity of pain and evaluate response  - Implement non-pharmacological measures as appropriate and evaluate response  - Consider cultural and social influences on pain and pain management  - Notify physician/advanced practitioner if interventions unsuccessful or patient reports new pain  Outcome: Adequate for Discharge     Problem: INFECTION - ADULT  Goal: Absence or prevention of progression during hospitalization  Description: INTERVENTIONS:  - Assess and monitor for signs and symptoms of infection  - Monitor lab/diagnostic results  - Monitor all insertion sites, i e  indwelling lines, tubes, and drains  - Monitor endotracheal if appropriate and nasal secretions for changes in amount and color  - Ash appropriate cooling/warming therapies per order  - Administer medications as ordered  - Instruct and encourage patient and family to use good hand hygiene technique  - Identify and instruct in appropriate isolation precautions for identified infection/condition  Outcome: Adequate for Discharge     Problem: INFECTION - ADULT  Goal: Absence of fever/infection during neutropenic period  Description: INTERVENTIONS:  - Monitor WBC    Outcome: Adequate for Discharge     Problem: DISCHARGE PLANNING  Goal: Discharge to home or other facility with appropriate resources  Description: INTERVENTIONS:  - Identify barriers to discharge w/patient and caregiver  - Arrange for needed discharge resources and transportation as appropriate  - Identify discharge learning needs (meds, wound care, etc )  - Arrange for interpretive services to assist at discharge as needed  - Refer to Case Management Department for coordinating discharge planning if the patient needs post-hospital services based on physician/advanced practitioner order or complex needs related to functional status, cognitive ability, or social support system  Outcome: Adequate for Discharge

## 2022-06-07 NOTE — ASSESSMENT & PLAN NOTE
- Status post fall with the below noted injuries  - Fall precautions  - Geriatric Medicine consultation appreciated  - PT and OT evaluation and treatment as indicated  Recommending inpatient rehab  - Case Management consultation for disposition planning  Discharge to [facility] today

## 2022-06-07 NOTE — PLAN OF CARE
Problem: PHYSICAL THERAPY ADULT  Goal: Performs mobility at highest level of function for planned discharge setting  See evaluation for individualized goals  Description: Treatment/Interventions: Functional transfer training, LE strengthening/ROM, Elevations, Therapeutic exercise, Endurance training, Patient/family training, Equipment eval/education, Bed mobility, Gait training          See flowsheet documentation for full assessment, interventions and recommendations  Outcome: Progressing  Note: Prognosis: Fair  Problem List: Decreased strength, Decreased range of motion, Decreased endurance, Impaired balance, Decreased mobility, Decreased safety awareness, Obesity, Orthopedic restrictions, Pain  Assessment: pt began tx session lying supine in the bed and was agreeable to participate in Pt intervention  progress was noted this tx session as pt was able to perform a supine,>sit EOB transfer w/ a decrease in level of assistancerequired compared to previous tx sessions min Ax1 w/ HHA to pull up into a seated EOB position  pt was able to sit EOB and perfrorm all TE activities w/o LOB >10 minutes  pt was able to increase ambulation distance to 60'x1 ora walker w/o LOB but required VC's for ora walker placement  pt was educated on all safe stair trial strategies prior to attempting stairs  pt was unable to complete 1 step in todays tx session aspt was unable to advance initial foot on to step as pt tried twice once with L foot and once with R foot  Continue to recommend post acute rehab services at the time of D/C in order to maximize pt functional independence and safety w/ all OOB mobility  post tx pt in recliner with call bell and all pt needs met           PT Discharge Recommendation: Post acute rehabilitation services          See flowsheet documentation for full assessment

## 2022-06-07 NOTE — CASE MANAGEMENT
Case Management Discharge Planning Note    Patient name Mose Hatchet  Location S /S -57 MRN [id number]  : 1945 Date 2022       Current Admission Date: 6/3/2022  Current Admission Diagnosis:Closed fracture of shaft of right humerus   Patient Active Problem List    Diagnosis Date Noted    Hyponatremia 2022    Fall 2022    Ambulatory dysfunction 2022    Closed fracture of shaft of right humerus 2022    CKD (chronic kidney disease) 2022    V-tach ([address] ) 2022    Hypertension 2022    Hyperlipidemia 2022      LOS (days): 4  Geometric Mean LOS (GMLOS) (days):   Days to GMLOS:     OBJECTIVE:  Risk of Unplanned Readmission Score: 15 78         Current admission status: Inpatient   Preferred Pharmacy:   [facility] [address] [address]  Phone: [phone] Fax: [phone]    Primary Care Provider: Kamini Lao DO    Primary Insurance: MEDICARE  Secondary Insurance: Daquan Barber    DISCHARGE DETAILS:    Discharge planning discussed with[de-identified] pt and his wife  Freedom of Choice: Yes  Comments - Freedom of Choice: [facility]    Treatment Team Recommendation: Short Term Rehab  Discharge Destination Plan[de-identified] Short Term Rehab ([facility])  Transport at Discharge : Wheelchair van  Dispatcher Contacted: Yes  Number/Name of Dispatcher: Delilah Brown  Transported by [employer] and Unit #): Malcom Riding  ETA of Transport (Date): 22  ETA of Transport (Time): 65 (Trauma AP, nurse, pt, his wife, and HFM)     CM spoke with pt and his wife, Mihai Beyer, who [facility] and deny additional questions

## 2022-06-07 NOTE — ASSESSMENT & PLAN NOTE
- Patient with chronic history of V-tach status post ablation at [facility] as well as history of ischemic cardiomyopathy with ICD in place   - Patient did have episode of V-tach requiring defibrillation on 05/13/2022 per outpatient notes  - Consider inpatient Cardiology consultation if patient requires operative intervention for perioperative evaluation and risk assessment   - Continue home medication regimen including Amiodarone and Mexiletine   - No evidence that the patient's fall was cardiac related

## 2022-06-07 NOTE — PROGRESS NOTES
Progress Note - Geriatric Medicine   Shweta Moya 77 y o  male MRN: [id number]  Unit/Bed#: S -01 Encounter: [id number]      Assessment/Plan:  Fall   S/p mechanical fall down 1 stair onto driveway  Fall precautions  OOB as tolerated  Early and frequent mobilization  PT/OT following  Will discharge to STR, accepted bed at [facility]    Closed fracture shaft of right humerus  Acute right humeral shaft fracture status post fall at home  Orthopedics was consulted, patient elected non operative management at this time  Continue coaptation splint  Continue nonweightbearing to RUE  Remains neurovascularly intact  Follow-up with orthopedics as recommended  Management per Orthopedics    Hyponatremia  Sodium level on admission 137, sodium level yesterday 131  Sodium level today 131, k 3 4- potassium replaced  Thought to be related to restarting torsemide  Encourage adequate p o  Hydration  If remains low consider consulting nephrology   Management per primary team    Ambulatory dysfunction  At a baseline ambulates with cane, occasionally uses RW  PT/OT following  Fall precautions  Out of bed as tolerated  Encourage early and frequent mobilization  Encourage adequate hydration and nutrition  Provide adequate pain management   Goal is to discharge to STR  Continue with PT/OT for continued strength and balance training     Constipation  Patient reports no BM since admission, although on documented since yesterday   Is periodically taking oxycodone  Continue bowel regimen while on pain medications  Is on Colace 100 mg b i d  And senna 2 tablets q h s  Consider adding miralax prn  Encourage adequate p o  Hydration    Subjective:   He is being seen today for geriatrics follow-up  Upon examination patient was out of bed in the chair, resting  He appeared comfortable and was in no acute distress  He said he is feeling well and offered no acute complaints  His appetite is good  He is sleeping well at night    His pain is mostly well controlled  He will likely discharge to short-term rehab today  Review of Systems   Constitutional: Negative for activity change, appetite change, chills, fatigue and fever  Eyes: Negative for visual disturbance  Respiratory: Negative for cough and shortness of breath  Cardiovascular: Negative for chest pain and palpitations  Gastrointestinal: Positive for constipation  Negative for abdominal distention, abdominal pain, diarrhea, nausea and vomiting  Genitourinary: Negative for difficulty urinating, dysuria, frequency, hematuria and urgency  Musculoskeletal: Positive for arthralgias and gait problem  Negative for back pain  Skin: Negative for color change and rash  Neurological: Positive for weakness  Negative for dizziness, seizures, syncope and light-headedness  Psychiatric/Behavioral: Negative for confusion, dysphoric mood and sleep disturbance  The patient is not nervous/anxious  Objective:     Vitals: Blood pressure 113/57, pulse 61, temperature 98 1 °F (36 7 °C), temperature source Oral, resp  rate 16, height 5' 4" (1 626 m), weight 117 kg (257 lb 11 5 oz), SpO2 96 %  ,Body mass index is 44 24 kg/m²  Intake/Output Summary (Last 24 hours) at 6/7/2022 9275  Last data filed at 6/6/2022 1809  Gross per 24 hour   Intake 500 ml   Output 475 ml   Net 25 ml       Current Medications: Reviewed    Physical Exam:   Physical Exam  Vitals reviewed  Constitutional:       General: He is not in acute distress  Appearance: He is well-developed  He is obese  He is not ill-appearing  HENT:      Head: Normocephalic and atraumatic  Mouth/Throat:      Mouth: Mucous membranes are moist       Pharynx: No oropharyngeal exudate or posterior oropharyngeal erythema  Cardiovascular:      Rate and Rhythm: Normal rate and regular rhythm  Heart sounds: No murmur heard  Pulmonary:      Effort: Pulmonary effort is normal  No respiratory distress        Breath sounds: Normal breath sounds  Abdominal:      General: Bowel sounds are normal  There is no distension  Palpations: Abdomen is soft  Tenderness: There is no abdominal tenderness  Musculoskeletal:         General: Swelling (mild r hand) and signs of injury (sling and splint on R arm) present  Cervical back: Neck supple  Right lower leg: Edema (trace) present  Left lower leg: Edema (trace) present  Skin:     General: Skin is warm and dry  Neurological:      General: No focal deficit present  Mental Status: He is alert and oriented to person, place, and time  Mental status is at baseline  Cranial Nerves: No cranial nerve deficit  Motor: Weakness present  Gait: Gait abnormal    Psychiatric:         Mood and Affect: Mood normal          Behavior: Behavior normal           Invasive Devices  Report    Peripheral Intravenous Line  Duration           Peripheral IV 06/04/22 Left;Ventral (anterior) Forearm 3 days                Lab, Imaging and other studies: I have personally reviewed pertinent reports

## 2022-06-07 NOTE — ASSESSMENT & PLAN NOTE
- Patient with chronic history of V-tach status post ablation at [facility] as well as history of ischemic cardiomyopathy with ICD in place   - Patient did have episode of V-tach requiring defibrillation on 05/13/2022 per outpatient notes  - Consider inpatient Cardiology consultation if patient requires operative intervention for perioperative evaluation and risk assessment   - Continue home medication regimen including Amiodarone and Mexiletine   - No evidence that the patient's fall was cardiac related

## 2022-06-07 NOTE — ASSESSMENT & PLAN NOTE
Lab Results   Component Value Date    EGFR 45 06/06/2022    EGFR 46 06/04/2022    EGFR 37 06/03/2022    CREATININE 1 48 (H) 06/06/2022    CREATININE 1 46 (H) 06/04/2022    CREATININE 1 76 (H) 06/03/2022     - Patient with baseline history of CKD stage 3 with baseline creatinine appearing to be between 1 4 & 1 5   - Creatinine on presentation mildly elevated above baseline to 1 76  Now back to baseline at 1 48  - Avoid nephrotoxic medications and hypotension  Home diuretic therapy resumed on 06/05/2022  - Monitor volume status with daily weights and accurate I/Os  - Continue to monitor electrolytes and renal function

## 2022-06-07 NOTE — PROGRESS NOTES
[facility]  Progress Note - Ashli Aver 1945, 77 y o  male MRN: [id number]  Unit/Bed#: S -01 Encounter: [id number]  Primary Care Provider: Jonathon Maloney DO   Date and time admitted to hospital: 6/3/2022  4:36 PM    Fall  Assessment & Plan  - Status post fall with the below noted injuries  - Fall precautions  - Geriatric Medicine consultation appreciated  - PT and OT evaluation and treatment as indicated  - Case Management consultation for disposition planning  * Closed fracture of shaft of right humerus  Assessment & Plan  - Acute right humeral shaft fracture, present on admission   - orthopedics consulted note appreciated  Patient elected non operative management  - continue coaptation splint and nonweightbearing status  - continue to monitor left upper extremity neurovascular exam   - Continue multimodal analgesic regimen   - Continue DVT prophylaxis  - PT and OT evaluation and treatment as indicated  - follow-up with orthopedics as an outpatient in 1 week  Hyponatremia  Assessment & Plan  · Patient with hyponatremia down to 131  · Likely associated with the restarting of patient's home torsemide  · Continue to monitor sodium levels daily, am bmp pending  · I&O      Hyperlipidemia  Assessment & Plan  - Chronic history of hyperlipidemia   - Continue medication regimen   - Outpatient follow-up per routine  Hypertension  Assessment & Plan  - Chronic history of hypertension   - Continue current medication regimen with Coreg  Diuretic therapy to be resumed on 06/05/2022  - Adjust management as indicated  - Outpatient follow-up per routine  V-tach [facility])  Assessment & Plan  - Patient with chronic history of V-tach status post ablation at [facility] as well as history of ischemic cardiomyopathy with ICD in place   - Patient did have episode of V-tach requiring defibrillation on 05/13/2022 per outpatient notes    - Consider inpatient Cardiology consultation if patient requires operative intervention for perioperative evaluation and risk assessment   - Continue home medication regimen including Amiodarone and Mexiletine   - No evidence that the patient's fall was cardiac related  CKD (chronic kidney disease)  Assessment & Plan  Lab Results   Component Value Date    EGFR 45 06/06/2022    EGFR 46 06/04/2022    EGFR 37 06/03/2022    CREATININE 1 48 (H) 06/06/2022    CREATININE 1 46 (H) 06/04/2022    CREATININE 1 76 (H) 06/03/2022     - Patient with baseline history of CKD stage 3 with baseline creatinine appearing to be between 1 4 & 1 5   - Creatinine on presentation mildly elevated above baseline to 1 76  Now back to baseline at 1 48  - Avoid nephrotoxic medications and hypotension  Home diuretic therapy resumed on 06/05/2022  - Monitor volume status with daily weights and accurate I/Os  - Continue to monitor electrolytes and renal function  Ambulatory dysfunction  Assessment & Plan  - Patient with chronic ambulatory dysfunction, status post fall  - Maintain fall precautions  - Up with assistance only  - Continue PT and OT evaluation and treatment as indicated  Disposition: continue med/surg status, placement pending     SUBJECTIVE:  Chief Complaint: No complaints    Subjective: Reports he has no pain, he is working with PT, no has no other complaints  OBJECTIVE:   Vitals:   Temp:  [97 7 °F (36 5 °C)-98 6 °F (37 °C)] 98 1 °F (36 7 °C)  HR:  [58-66] 58  Resp:  [15-18] 16  BP: ()/(48-66) 99/48    Intake/Output:  I/O       06/05 0701  06/06 0700 06/06 0701  06/07 0700    P  O  240 500    Total Intake(mL/kg) 240 (2 1) 500 (4 3)    Urine (mL/kg/hr) 350 (0 1) 475 (0 2)    Stool  0    Total Output 350 475    Net -110 +25          Unmeasured Urine Occurrence  1 x    Unmeasured Stool Occurrence  1 x         Nutrition: Diet Regular; Regular House  GI Proph/Bowel Reg: Colace, Senokot  VTE Prophylaxis:Enoxaparin (Lovenox)     Physical Exam:   GENERAL APPEARANCE: No acute distress, resting supine in bed  NEURO: AAOX3, GCS 15, no focal neuro deficit  HEENT: PERRL EOMI, mucus membranes moist  CV: RRR S1 S2 without wheezes, crackles, or rhonchi  LUNGS: Clear to ausc bilaterally without wheezes, crackles, or rhonchi  GI: Soft, nontender nondistended  : voiding independently   MSK: RUE in splint and sling, DVNI   SKIN: warm, dry, intact    Invasive Devices  Report    Peripheral Intravenous Line  Duration           Peripheral IV 06/04/22 Left;Ventral (anterior) Forearm 2 days                      Lab Results:   BMP/CMP:   No results found for: SODIUM, K, CL, CO2, ANIONGAP, BUN, CREATININE, GLUCOSE, CALCIUM, AST, ALT, ALKPHOS, PROT, BILITOT, EGFR and CBC: No results found for: WBC, HGB, HCT, MCV, PLT, ADJUSTEDWBC, MCH, MCHC, RDW, MPV, NRBC  Imaging/EKG Studies: I have personally reviewed pertinent reports       Other Studies: none

## 2022-06-07 NOTE — ASSESSMENT & PLAN NOTE
· Patient with hyponatremia down to 131  · Likely associated with the restarting of patient's home torsemide    · F/u with PCP in 1 week with repeat BMP

## 2022-06-09 ENCOUNTER — NURSING HOME VISIT (OUTPATIENT)
Dept: GERIATRICS | Facility: OTHER | Age: 77
End: 2022-06-09
Payer: MEDICARE

## 2022-06-09 DIAGNOSIS — R74.8 ABNORMAL LIVER ENZYMES: ICD-10-CM

## 2022-06-09 DIAGNOSIS — S42.321D CLOSED DISPLACED TRANSVERSE FRACTURE OF SHAFT OF RIGHT HUMERUS WITH ROUTINE HEALING, SUBSEQUENT ENCOUNTER: Primary | ICD-10-CM

## 2022-06-09 DIAGNOSIS — I42.9 CARDIOMYOPATHY, UNSPECIFIED TYPE (HCC): ICD-10-CM

## 2022-06-09 DIAGNOSIS — F32.A DEPRESSION, UNSPECIFIED DEPRESSION TYPE: ICD-10-CM

## 2022-06-09 DIAGNOSIS — E87.1 HYPONATREMIA: ICD-10-CM

## 2022-06-09 DIAGNOSIS — T50.2X5A DIURETIC-INDUCED HYPOKALEMIA: ICD-10-CM

## 2022-06-09 DIAGNOSIS — Z95.810 AICD (AUTOMATIC CARDIOVERTER/DEFIBRILLATOR) PRESENT: ICD-10-CM

## 2022-06-09 DIAGNOSIS — M21.961 ACQUIRED ANKLE/FOOT DEFORMITY, RIGHT: ICD-10-CM

## 2022-06-09 DIAGNOSIS — Z78.9 FULL CODE STATUS: ICD-10-CM

## 2022-06-09 DIAGNOSIS — N18.31 STAGE 3A CHRONIC KIDNEY DISEASE (HCC): Chronic | ICD-10-CM

## 2022-06-09 DIAGNOSIS — I47.2 VENTRICULAR TACHYCARDIA (PAROXYSMAL) (HCC): ICD-10-CM

## 2022-06-09 DIAGNOSIS — I50.42 CHRONIC COMBINED SYSTOLIC AND DIASTOLIC CONGESTIVE HEART FAILURE (HCC): ICD-10-CM

## 2022-06-09 DIAGNOSIS — E78.5 HYPERLIPIDEMIA, UNSPECIFIED HYPERLIPIDEMIA TYPE: Chronic | ICD-10-CM

## 2022-06-09 DIAGNOSIS — I10 HYPERTENSION, UNSPECIFIED TYPE: Chronic | ICD-10-CM

## 2022-06-09 DIAGNOSIS — E87.6 DIURETIC-INDUCED HYPOKALEMIA: ICD-10-CM

## 2022-06-09 DIAGNOSIS — R73.03 PRE-DIABETES: ICD-10-CM

## 2022-06-09 PROBLEM — I25.5 ISCHEMIC CARDIOMYOPATHY: Status: ACTIVE | Noted: 2022-06-09

## 2022-06-09 PROBLEM — I47.20 V-TACH: Status: ACTIVE | Noted: 2022-06-03

## 2022-06-09 PROBLEM — I47.29 VENTRICULAR TACHYCARDIA (PAROXYSMAL): Status: ACTIVE | Noted: 2022-06-03

## 2022-06-09 PROCEDURE — 99306 1ST NF CARE HIGH MDM 50: CPT | Performed by: INTERNAL MEDICINE

## 2022-06-09 RX ORDER — ESCITALOPRAM OXALATE 10 MG/1
10 TABLET ORAL DAILY
COMMUNITY

## 2022-06-09 RX ORDER — SENNA PLUS 8.6 MG/1
2 TABLET ORAL DAILY
COMMUNITY

## 2022-06-09 NOTE — PROGRESS NOTES
[facility]  [address] [place] [address]  Facility:  [facility]  31    HISTORY AND PHYSICAL    NAME: Erika Gibbs  AGE: 77 y o  SEX: male    DATE OF ENCOUNTER: 6/9/2022    Code status:  CPR    Assessment and Plan     1  Closed displaced transverse fracture of shaft of right humerus with routine healing, subsequent encounter  Assessment & Plan:  · Suffered after fall from a step onto an asphalt driveway   · In consultation with his orthopedic service, he has elected for nonsurgical treatment at this time  · Will continue with multimodal pain management  · He is nonweightbearing to his right upper extremity  Secondary to his decreased level of functioning from baseline, he will be admitted to the subacute rehabilitation facility and seen in consultation by a multidisciplinary rehabilitation team for evaluation and treatment to assist him in returning to his prior level of functioning  · Will continue his care in collaboration with the orthopedic service   · He will be following up in 1 week with imaging with his orthopedic service   · Will continue with monitoring for change in his condition   · He will follow up with his PCP and orthopedic services upon discharge      2  Chronic combined systolic and diastolic congestive heart failure [facility])  Assessment & Plan:  · Demonstrated on echocardiogram performed at outside facility on September 23, 2021 (estimated ejection fraction 45% with grade 2 diastolic dysfunction)  · Will continue his prior to admission torsemide and Entresto  · Will continue with clinical and periodic laboratory monitoring for change in his condition   · He will follow up with his PCP and cardiology services upon discharge      3   Cardiomyopathy, unspecified type [facility])  Assessment & Plan:  · AICD in place   · Will continue his prior to admission torsemide and Entresto  · Will continue with clinical and periodic laboratory monitoring for change in his condition   · He will follow up with his PCP and cardiology services upon discharge      4  Diuretic-induced hypokalemia  Assessment & Plan:  · June 7, 2022: Potassium 3 4  · Will repeat his laboratory value   · Will supplement, as needed  · Will continue with clinical and periodic laboratory monitoring for change in his condition      5  Hyponatremia  Assessment & Plan:  · June 7, 2022:  Sodium 131  · Will continue with clinical and periodic laboratory monitoring for change in his condition   · He will follow up with his PCP upon discharge      6  Ventricular tachycardia (paroxysmal) ([address] )  Assessment & Plan:  · As verified with his cardiology service's office, I will continue his prior to admission amiodarone, carvedilol, Entresto, and mexiletine  · Will have his potassium and magnesium levels rechecked  · Will target a potassium level greater than 4 and magnesium level greater than 2  · He has an AICD in place   · Will continue his care in collaboration with his cardiology service   · Will continue with monitoring for change in his condition   · He will follow up with his PCP and cardiology services upon discharge      7  Stage 3a chronic kidney disease [facility])  Assessment & Plan:  · June 7, 2022: Creatinine 1 17, EGFR 60  · Will continue with avoidance of nephrotoxic medications and supplements   · Will monitor closely with restarting of torsemide   · Will continue with clinical and periodic laboratory monitoring for changes condition  · He will follow up with his PCP upon discharge      8  Hypertension, unspecified type  Assessment & Plan:  · Will continue his prior to admission Entresto  · Will continue with clinical and periodic laboratory monitoring for change in his condition   · He will follow up with his PCP upon discharge      9   Pre-diabetes  Assessment & Plan:  · May 19, 2022: Hemoglobin A1c:  5 9%,   · Will monitor his fasting fingerstick blood sugar   · Will continue with monitoring for change in his condition   · He will follow up with his PCP upon discharge      10  Depression, unspecified depression type  Assessment & Plan:  · He endorses that escitalopram is helpful  · He denies suicidal ideation  · He is following with his spiritual service for support and has reached out to them today  · As discussed with him, escitalopram 10 mg is the maximum daily dosage recommended in the geriatric population  · I will lower the dosage of his escitalopram from 20 mg daily to 10 mg daily and have advised him to notify anyone in his treatment team if he notices a change in his mood  · Will continue with monitoring for change in his condition  · He will follow up with his PCP upon discharge      11  Hyperlipidemia, unspecified hyperlipidemia type  Assessment & Plan:  · May 19, 2022: Fasting lipid profile: Total cholesterol 115, triglycerides 110, HDL 36, LDL 57  · Will continue his prior to admission atorvastatin  · Will repeat his liver function panel as his recent testing revealed abnormal liver function tests  · Will continue with clinical and periodic laboratory monitoring for changes condition  · He will follow up with PCP upon discharge      12  Abnormal liver enzymes  Assessment & Plan:  · Jaz 3, 2022: ALT 82, AST 60  · He is asymptomatic  · Will repeat laboratory values  · Will continue with monitoring for change in his condition  · He will follow up with his PCP upon discharge      13  Acquired ankle/foot deformity, right  Assessment & Plan:  · He notes chronic condition  · He endorses following with a podiatrist as an outpatient   · He requests consultation with the facility Podiatry service to see if he can be fitted for appropriate shoes  · He will follow up with his PCP and Podiatry services upon discharge      14  Full code status  Assessment & Plan:  · As discussed with him during my visit, he wishes for his resuscitation status to be CPR      15   AICD (automatic cardioverter/defibrillator) present  Assessment & Plan:  · Notes his wife is to bring in wall monitor        All medications and routine orders were reviewed and updated as needed  Plan discussed with:  Patient, nursing staff, and social service staff  CC: PCP    Chief Complaint     He is seen for visit to perform history and physical exam to be admitted to the subacute rehabilitation facility  History of Present Illness     History is obtained from patient interview, review electronic medical record, review of outside electronic medical record, and medication review with his cardiologist's office nursing staff         He is a pleasant 61-year-old gentleman with the comorbidities of cardiomyopathy status post AICD, paroxysmal ventricular tachycardia, diuretic-induced hypokalemia, hyponatremia, prediabetes, depression, hypertension, hyperlipidemia, and recent hospitalization for a fractured right humerus who is seen for a visit to perform a history and physical exam to be admitted to the subacute rehabilitation facility  He presented to the emergency room on Jaz 3, 2022 complaining of right arm pain after experiencing a fall from a step onto his asphalt driveway  Trauma imaging in the emergency room revealed an acute fracture of his right humeral shaft  He was admitted to the acute care hospital from Jaz 3, 2022 through June 7, 2022  He was seen in consultation by the Orthopedic, therapy, and geriatric services during his hospitalization  After discussing nonsurgical and surgical options with the orthopedic service, he chose nonsurgical treatment  He was advised to wear a sling and is nonweightbearing to his right upper extremity  He reports feeling the bones move within his arm when he tries to move his arm  He is unable to move his lower arm  He notes that his pain is controlled and is avoiding opioid medications  He was diagnosed with acute on chronic kidney disease that responded to fluid resuscitation    Therapy service recommended a post acute care rehabilitation stay prior to returning home  He reports feeling generally well  He notes that his wife called him today and finalized her request for divorce  He reports following with his spiritual service regarding his marriage prior to his acute injury  He denies being suicidal P he has reached out to his spiritual service today  HISTORY:  Past Medical History:   Diagnosis Date    Bilateral hearing loss     CHF (congestive heart failure) (HCC)     Depression     Frequent falls     Hypertension     ICD (implantable cardioverter-defibrillator) in place     Ischemic cardiomyopathy     Morbid [facility] [address]     Sleep apnea     Ventricular tachycardia ([facility] [address] )      Past Surgical History:   Procedure Laterality Date    CARDIAC ELECTROPHYSIOLOGY STUDY AND ABLATION      Ventricular tachycardia ablation at [address] by Dr Priyanka Carey       Family History   Problem Relation Age of Onset    Heart attack Father      Social History     Socioeconomic History    Marital status: /Civil Union     Spouse name: Not on file    Number of children: Not on file    Years of education: Not on file    Highest education level: Not on file   Occupational History    Not on file   Tobacco Use    Smoking status: Never Smoker    Smokeless tobacco: Never Used   Vaping Use    Vaping Use: Never used   Substance and Sexual Activity    Alcohol use: Not Currently    Drug use: Not Currently    Sexual activity: Not on file   Other Topics Concern    Not on file   Social History Narrative    Not on file     Social Determinants of Health     Financial Resource Strain: Not on file   Food Insecurity: Not on file   Transportation Needs: No Transportation Needs    Lack of Transportation (Medical): No    Lack of Transportation (Non-Medical):  No   Physical Activity: Not on file   Stress: Not on file   Social Connections: Not on file Intimate Partner Violence: Not on file   Housing Stability: Not on file       Allergies:  No Known Allergies    Review of Systems     Review of Systems   Constitutional: Negative for fever  HENT: Positive for dental problem (Is missing teeth, but states no trouble chewing) and hearing loss ( has hearing loss and uses bilateral hearing aids  )  Negative for trouble swallowing  Respiratory: Negative for shortness of breath  Cardiovascular: Positive for leg swelling ( has chronic leg swelling  Currently, states at baseline or slightly better  )  Negative for chest pain  Gastrointestinal: Negative for abdominal pain, constipation and nausea  Genitourinary: Negative for difficulty urinating and dysuria  Musculoskeletal: Negative for arthralgias  Denies pain in his fractured arm  Skin: Negative for rash  Taper mandates can leave a anabel  Neurological: Negative for headaches  Hematological: Does not bruise/bleed easily  Psychiatric/Behavioral: Negative for dysphoric mood and suicidal ideas  Medications and orders     All medications reviewed and updated in Nursing Home EMR  Objective     Vitals:  Weight 268 8 lb, temperature 97 3° F, pulse 61, blood pressure 95/58, pulse oximetry 96% on room air  Physical Exam  Vitals reviewed  Constitutional:       General: He is awake  He is not in acute distress  Appearance: He is well-developed  He is morbidly obese  He is not toxic-appearing or diaphoretic  Comments: He appears comfortable sitting by his bedside, stated age, and frail  HENT:      Head: Normocephalic  Nose: Nose normal       Mouth/Throat:      Mouth: Mucous membranes are moist    Eyes:      General: No scleral icterus  Conjunctiva/sclera: Conjunctivae normal    Cardiovascular:      Rate and Rhythm: Normal rate and regular rhythm  Heart sounds: Normal heart sounds  No murmur heard  No friction rub  No gallop  Comments:  There is 4+ bilateral distal pretibial and pedal edema  Pulmonary:      Effort: Pulmonary effort is normal  No respiratory distress  Breath sounds: Normal breath sounds  No stridor  No wheezing, rhonchi or rales  Abdominal:      General: There is no distension  Palpations: Abdomen is soft  There is no mass  Tenderness: There is no abdominal tenderness  There is no guarding  Hernia: No hernia is present  Musculoskeletal:         General: Swelling present  Cervical back: Neck supple  Skin:     Findings: No rash  Neurological:      Mental Status: He is alert  Psychiatric:         Mood and Affect: Mood normal          Behavior: Behavior normal  Behavior is cooperative  Pertinent Laboratory/Diagnostic Studies: The following labs/studies were reviewed please see chart or hospital paperwork for details  Lab Results   Component Value Date    WBC 7 63 06/04/2022    HGB 12 3 06/04/2022    HCT 38 3 06/04/2022     06/04/2022    ALT 82 (H) 06/03/2022    AST 60 (H) 06/03/2022    K 3 4 (L) 06/07/2022    CL 94 (L) 06/07/2022    CREATININE 1 17 06/07/2022    BUN 34 (H) 06/07/2022    CO2 28 06/07/2022    INR 1 14 06/03/2022    HGBA1C 5 9 (H) 05/19/2022     Jaz 3, 2022:     Trauma series:    FINDINGS:     CHEST:     Supine frontal view of the chest is obtained  Left chest wall AICD device      Cardiomediastinal silhouette is probably top normal accounting for technique, low lung volumes and patient positioning         Shallow depth of inspiration  Lungs are clear  No layering pleural effusions detected        No pneumothorax is seen on this supine film  Upright images are more sensitive to detect anterior pneumothoraces if relevant      No displaced fractures         RIGHT HUMERUS:     1 view is reviewed         There is a displaced, angulated right mid humeral shaft fracture with bony overriding    Visualized proximal radius and ulna appear intact         IMPRESSION:     No acute cardiopulmonary disease within limitations of supine imaging      Displaced, angulated right mid humeral shaft fracture  Jaz 3, 2022:     ECG 12 lead    Status: Final result       Study Result    Narrative & Impression   Electronic ventricular pacemaker  Confirmed by Diann Payne ([id number]) on 6/5/2022 7:36:20 AM     September 23, 2021:     2D transthoracic echocardiogram:     Narrative    This result has an attachment that is not available    Technically challenging study     The left ventricular ejection fraction is 45%   Berle Reyna is an apical wall motion abnormality present     The left atrium is enlarged     There is mild aortic stenosis     There is mild to moderate mitral regurgitation     There is mild tricuspid regurgitation     Pulmonary artery pressures are normal by echocardiographic criteria       - His admission order summary was reviewed and signed  Portions of the record may have been created with voice recognition software  Occasional wrong word or "sound a like" substitutions may have occurred due to the inherent limitations of voice recognition software  Read the chart carefully and recognize, using context, where substitutions have occurred      KRISTIN Kay   6/9/2022 11:41 PM

## 2022-06-09 NOTE — UTILIZATION REVIEW
Initial Clinical Review    Admission: Date/Time/Statement:   Admission Orders (From admission, onward)     Ordered        06/03/22 1749  Inpatient Admission  Once                      Orders Placed This Encounter   Procedures    Inpatient Admission     Standing Status:   Standing     Number of Occurrences:   1     Order Specific Question:   Level of Care     Answer:   Med Surg [16]     Order Specific Question:   Bed Type     Answer:   Trauma [7]     Order Specific Question:   Estimated length of stay     Answer:   More than 2 Midnights     Order Specific Question:   Certification     Answer:   I certify that inpatient services are medically necessary for this patient for a duration of greater than two midnights  See H&P and MD Progress Notes for additional information about the patient's course of treatment  ED Arrival Information     Expected   -    Arrival   6/3/2022 16:36    Acuity   Emergent            Means of arrival   Ambulance    Escorted by   [facility]    Service   Trauma    Admission type   [address] complaint   -           Chief Complaint   Patient presents with    Fall     Refer to trauma charting       Initial Presentation: 77 y o  male to ED by EMS as Inpatient admission to Trauma service for evaluation & treatment of Closed FX of shaft of R humerus, ambulatory dysfunction s/p Fall ; Hyponatremia   PMH  HTN, CKD, chronic Vtach s/p ablation now s/p mechanical fall down a step onto driveway w head strike denies LOC  EXAM  Posterior scalp laceration R upper extremity deformity w tenderness to palpation, XR R arm displaced R humeral FX  PLAN  Consult Ortho, PT/OT;  pain management, fall precautions, Neurovascular exam Left upper extremity, NWB R upper extremity in Splint  Repeat BMP, cont home meds    Date: 6/4/2022   Day 2:   Trauma  IVF bolus; Cont w R Humerus pain no evidence of compartment syndrome   Non operative management per consult discussion w Ortho; maintain splint & sling R upper extremity; PT OT, home meds; home vs rehab  Ortho  R humeral shaft FX w good alignment after splinting w intact Neurovascular status  Discussed surgical vs non surgical approaches patient prefers non surgical approach; cont use of coaptation & posterior splint;   follow up in 1 wk for splint to a functional clamshell brace   PT EVAL for DC dispo:   Post acute rehab w ora walker use w mobility    6/5/2022  RN early AM  NOTE: running soft BPs throughout night shift  Most recent 80/48 around 3a  Patient is asymptomatic  TRAUMA  Cont w R upper arm pain & sensation of involuntary movements; Medication cont to assist w pain  BP med w Coreg only w diuretics yto be resumed on 6/5, repeat BMP monitor creatine,electrolytes    6/6/2022  RN Note  near fall event in the bathroom on the evening of 6/5  Pt went to the bathroom with the assistance of PCA using four point cane and when he was turning around away from the toilet his right knee gave out and he lowered his knee to the floor, and than lowered left knee as well  He had difficult time getting up from the kneeling position  We required three people assistance to get him to sitting position on the chair  We used sit to stand to transfer pt from chair to bad since he felt his legs were week  Vital signs were stable: /74, AK 62, O2 94%  Trauma team came to evaluate pt at the bedside  Patient declines any pain or discomfort at that time  6/6/2022 Trauma  Hyponatremia this am downtrend to 131; likely associated w restarting home Torsemid; monitor daily levels, replete electrolytes, renal function  Multinodal analgesic regimen w PT OT , follow up 1 wk     6/6/2022 RN PM  Note BP noted to be in 19O systolic, patient asymptomatic  Cardiac medoications with hold parameters still on hold  6/6/2022 Geriatric Medicine  OOB as janee; early & freq mobilization, NWB RUE, remains neurovascularly intact   IF NA cont to downtrend conslt Nephrology, on Coreg & home Torsemide    6/7 TRAUMA   DC to rehab today; maintain RUE in splint   ED Triage Vitals   Temperature Pulse Respirations Blood Pressure SpO2   06/03/22 1635 06/03/22 1635 06/03/22 1635 06/03/22 1635 06/03/22 1635   98 9 °F (37 2 °C) 68 18 121/66 92 %      Temp Source Heart Rate Source Patient Position - Orthostatic VS BP Location FiO2 (%)   06/03/22 2056 06/03/22 1645 06/03/22 2039 06/03/22 2039 --   Oral Monitor Lying Right arm       Pain Score       06/03/22 1831       7          Wt Readings from Last 1 Encounters:   06/03/22 117 kg (257 lb 11 5 oz)     Additional Vital Signs:   Date/Time Temp Pulse Resp BP MAP (mmHg) SpO2 O2 Device Patient Position - Orthostatic VS   06/07/22 10:43:07 -- 60 -- 91/53 66 98 % -- --   06/07/22 10:31:14 -- -- 16 86/49 Abnormal  61 -- -- --   06/07/22 0845 -- -- -- -- -- -- None (Room air) --   06/07/22 08:35:51 -- 61 -- 113/57 76 96 % -- --   06/07/22 07:07:13 -- 58 16 99/48 Abnormal  65 97 % -- --   06/07/22 04:05:32 -- 60 -- 99/55 70 96 % -- --   06/06/22 22:51:02 98 1 °F (36 7 °C) 63 18 90/52 -- 96 % -- Lying   06/06/22 20:01:31 98 3 °F (36 8 °C) 66 18 88/50 Abnormal  63 93 % -- --   06/06/22 19:58:40 98 3 °F (36 8 °C) 61 18 90/50 63 93 % -- --   06/06/22 1745 -- -- -- 103/58 -- -- -- --   06/06/22 16:04:10 97 7 °F (36 5 °C) 60 17 103/61 75 95 % -- --   06/06/22 15:46:12 -- 64 -- 115/66 82 94 % -- --   06/06/22 11:00:36 97 8 °F (36 6 °C) 60 15 86/51 Abnormal  63 94 % -- --   06/06/22 09:33:13 98 5 °F (36 9 °C) 60 -- 99/48 Abnormal  65 95 % -- --   06/06/22 0800 -- -- -- -- -- -- None (Room air) --   06/06/22 07:45:47 98 6 °F (37 °C) 60 17 107/63 78 92 % -- --   06/06/22 0300 98 °F (36 7 °C) 64 18 121/74 -- 94 % -- --   06/05/22 2226 -- 62 18 114/72 -- 92 % -- --   06/05/22 22:12:29 98 2 °F (36 8 °C) 60 18 93/52 66 92 % -- --   06/05/22 1900 97 6 °F (36 4 °C) 61 16 94/40 Abnormal  -- 95 % -- --   06/05/22 1634 -- -- -- 98/68 -- -- -- Sitting   06/05/22 1606 98 4 °F (36 9 °C) 60 18 105/60 75 95 % -- --   06/05/22 1600 -- -- -- -- -- -- None (Room air) --   06/05/22 12:07:31 -- 62 -- 96/58 71 93 % -- --   06/05/22 1200 -- -- -- -- -- -- None (Room air) --   06/05/22 0800 -- -- -- -- -- -- None (Room air) --   06/05/22 07:52:19 98 3 °F (36 8 °C) 61 18 109/60 76 94 % -- --   06/05/22 02:32:19 97 6 °F (36 4 °C) 61 18 98/48 Abnormal  65 94 % -- --   06/05/22 00:11:51 -- 61 -- 95/50 65 95 % -- --       Weights (last 14 days) before discharge    Date/Time Weight Weight Method Height   06/03/22 2100 -- -- 5' 4" (1 626 m)   06/03/22 16:35:16 117 kg (257 lb 11 5 oz) Bed scale --       Pertinent Labs/Diagnostic Test Results:   XR humerus right   Final Result by Navi Richmond MD (06/04 2308)      Improved alignment in a fracture of the mid right humeral shaft  Workstation performed: [id number]         XR Trauma multiple (B/RA trauma bay ONLY)   Final Result by Deanna Kamara DO (06/03 2018)      No acute cardiopulmonary disease within limitations of supine imaging  Displaced, angulated right mid humeral shaft fracture  Workstation performed: [id number]         XR chest 1 view   Final Result by Deanna Kamara DO (06/06 1024)      No acute cardiopulmonary disease within limitations of supine imaging  Displaced, angulated right mid humeral shaft fracture  Workstation performed: [id number]         XR humerus right   Final Result by Deanna Kamara DO (06/06 1024)      No acute cardiopulmonary disease within limitations of supine imaging  Displaced, angulated right mid humeral shaft fracture  Workstation performed: [id number]         TRAUMA - CT head wo contrast   Final Result by Deanna Kamara DO (06/03 1715)      No acute intracranial abnormality  Posterior occipital scalp contusion/laceration without calvarial fracture        TRAUMA - CT spine cervical wo contrast   Final Result by Shanda Sandifer Michelle Acosta DO (06/03 1723)      No cervical spine fracture or traumatic malalignment  CT upper extremity wo contrast right   Final Result by Zoltan Hardy DO (06/03 1806)      Displaced and angulated right mid humeral shaft fracture  Negative for proximal right humeral fracture  Limited evaluation of the distal humerus  If there is tenderness in this region, consider dedicated plain film imaging of the elbow         Results from last 7 days   Lab Units 06/04/22  0454 06/03/22  1643   WBC Thousand/uL 7 63 7 48   HEMOGLOBIN g/dL 12 3 12 7   HEMATOCRIT % 38 3 39 2   PLATELETS Thousands/uL 202 215   NEUTROS ABS Thousands/µL  --  5 49         Results from last 7 days   Lab Units 06/07/22  0923 06/06/22  0503 06/04/22  0454 06/03/22  1643   SODIUM mmol/L 131* 131* 136 137   POTASSIUM mmol/L 3 4* 3 7 4 0 4 1   CHLORIDE mmol/L 94* 95* 97 94*   CO2 mmol/L 28 30 28 31   ANION GAP mmol/L 9 6 11 12   BUN mg/dL 34* 37* 29* 30*   CREATININE mg/dL 1 17 1 48* 1 46* 1 76*   EGFR ml/min/1 73sq m 60 45 46 37   CALCIUM mg/dL 8 5 8 1* 8 6 9 1   MAGNESIUM mg/dL  --   --  2 4  --      Results from last 7 days   Lab Units 06/03/22  1643   AST U/L 60*   ALT U/L 82*   ALK PHOS U/L 102   TOTAL PROTEIN g/dL 7 1   ALBUMIN g/dL 3 4*   TOTAL BILIRUBIN mg/dL 1 14*         Results from last 7 days   Lab Units 06/07/22  0923 06/06/22  0503 06/04/22  0454 06/03/22  1643   GLUCOSE RANDOM mg/dL 104 109 68 81             No results found for: BETA-HYDROXYBUTYRATE                           Results from last 7 days   Lab Units 06/03/22  1643   PROTIME seconds 14 6*   INR  1 14   PTT seconds 28         ED Treatment:   Medication Administration from 06/03/2022 1629 to 06/03/2022 2052       Date/Time Order Dose Route Action     06/03/2022 1721 fentanyl citrate (PF) (FOR EMS ONLY) 100 mcg/2 mL injection 100 mcg 0 mcg Does not apply Given to EMS     06/03/2022 1906 enoxaparin (LOVENOX) subcutaneous injection 30 mg 30 mg Subcutaneous Not Given     06/03/2022 1820 propofol (DIPRIVAN) 200 MG/20ML bolus injection 100 mg   Intravenous Canceled Entry     06/03/2022 1818 propofol (DIPRIVAN) 200 MG/20ML bolus injection 40 mg 50 mg Intravenous Given by Other     06/03/2022 1823 propofol (DIPRIVAN) 200 MG/20ML bolus injection 10 mg 10 mg Intravenous Given by Other     06/03/2022 1826 propofol (DIPRIVAN) 200 MG/20ML bolus injection 10 mg 10 mg Intravenous Given by Other     06/03/2022 1831 fentanyl citrate (PF) 100 MCG/2ML 50 mcg 50 mcg Intravenous Given        Past Medical History:   Diagnosis Date    Bilateral hearing loss     [facility]     Depression     Frequent falls     Hypertension     ICD (implantable cardioverter-defibrillator) in place     Ischemic cardiomyopathy     Morbid obesity ([facility]     Sleep apnea     Ventricular tachycardia ([facility] )      Present on Admission:   Fall   Ambulatory dysfunction   Closed fracture of shaft of right humerus   CKD (chronic kidney disease)   V-tach ([facility] )   Hypertension   Hyperlipidemia      Admitting Diagnosis: Weakness [R53 1]  Age/Sex: 77 y o  male  Admission Orders:  NWB JOHN  Scheduled Medications:  acetaminophen (TYLENOL) tablet 975 mg  Dose: 975 mg  Freq: Every 8 hours Route: PO  Start: 06/04/22 0745 End: 06/07/22 1418          0845     1645     2244      0974     1636 (3642) 1457 7377 2598 4003     1418-D/C'd      amiodarone tablet 200 mg  Dose: 200 mg  Freq: 2 times daily Route: PO  Start: 06/03/22 1915 End: 06/07/22 1418   Admin Instructions: Take with food or milk           2120      0845     1833 3073 5541 2718 9208 7918     1418-D/C'd      atorvastatin (LIPITOR) tablet 20 mg  Dose: 20 mg  Freq: Daily with dinner Route: PO  Start: 06/03/22 1915 End: 06/07/22 1418         2113      1647      1637      1546         carvedilol (COREG) tablet 18 75 mg  Dose: 18 75 mg  Freq: 2 times daily with meals Route: PO  Start: 06/04/22 0730 End: 06/07/22 1418   Admin Instructions:   Hold for heart rate less than 50 beats per minute  Look alike sound alike  Order specific questions:   Hold for systolic blood pressure less than (mmHg) 110          0630     (1644)      (0912)     1011 [C]     (1636)      0933     1545      (6986)     0836     1418-D/C'd      docusate sodium (COLACE) capsule 100 mg  Dose: 100 mg  Freq: 2 times daily Route: PO  Indications of Use: CONSTIPATION  Start: 06/03/22 1800 End: 06/07/22 1418 2119      0844 (5235) 6338     1759 (9116)     (1403)      0835     1418-D/C'd      enoxaparin (LOVENOX) subcutaneous injection 30 mg  Dose: 30 mg  Freq: Every 12 hours scheduled Route: SC  Start: 06/04/22 1045 End: 06/07/22 1418   Admin Instructions:   High Alert Medication, Check for allergies to pork or pork derivatives/dietary restrictions before administration  LOOK ALIKE SOUND ALIKE MED          6811     2058      1010     2006 0934     2004      0838     1418-D/C'd      enoxaparin (LOVENOX) subcutaneous injection 30 mg  Dose: 30 mg  Freq: Every 12 hours Route: SC  Start: 06/03/22 1800 End: 06/03/22 1906   Admin Instructions:   High Alert Medication, Check for allergies to pork or pork derivatives/dietary restrictions before administration   LOOK ALIKE SOUND ALIKE MED         (1906) [C]     1906-D/C'd          escitalopram (LEXAPRO) tablet 20 mg  Dose: 20 mg  Freq: Daily Route: PO  Start: 06/03/22 2115 End: 06/07/22 1418   Admin Instructions:   LOOK ALIKE SOUND ALIKE MED         3861      2058      2006 2005      1418-D        gabapentin (NEURONTIN) capsule 100 mg  Dose: 100 mg  Freq: 3 times daily Route: PO  Start: 06/05/22 0945 End: 06/07/22 1418   Admin Instructions:   LOOK ALIKE SOUND ALIKE MED           9383     2106     2006 0932     1545     2005      0835     1418-D/C'd      heparin (porcine) subcutaneous injection 7,500 Units  Dose: 7,500 Units  Freq: Every 8 hours scheduled Route: SC  Start: 06/03/22 2000 End: 06/04/22 1044   Admin Instructions:   Check for allergies to pork or pork derivatives/dietary restrictions before administration  High alert medication  LOOK ALIKE SOUND ALIKE MED         2118      0602     1044-D/C'd         methocarbamol (ROBAXIN) tablet 250 mg  Dose: 250 mg  Freq: Every 6 hours scheduled Route: PO  Start: 06/04/22 0745 End: 06/07/22 1418          0845     1318) [C]     1838      0011     3413 0113 8265 (5213) 7335     123     1738 (7776) 0534 6003        mexiletine (MEXITIL) capsule 150 mg  Dose: 150 mg  Freq: 3 times daily Route: PO  Start: 06/03/22 2100 End: 06/07/22 1418   Admin Instructions:   Hold for heart rate less than 50 beats per minute  Administer with food  2120      0849     1646     2247      1138     1638     2006      0934     1545     2005      0836 [C]     1418-D        multi-electrolyte (ISOLYTE-S PH 7 4) bolus 250 mL  Dose: 250 mL  Freq: Once Route: IV  Last Dose: Stopped (06/04/22 1800)  Start: 06/04/22 1500 End: 06/04/22 1800          1642     1800           potassium chloride (K-DUR,KLOR-CON) CR tablet 20 mEq  Dose: 20 mEq  Freq: Once Route: PO  Start: 06/07/22 1015 End: 06/07/22 1040   Admin Instructions:   Swallow whole; do not crush or chew  Tablet may be split in half to facilitate swallowing  1040        propofol (DIPRIVAN) 200 MG/20ML bolus injection 10 mg  Dose: 10 mg  Freq: Once Route: IV  Start: 06/03/22 1830 End: 06/03/22 1826   Admin Instructions:   IV PUSH BY PHYSICIAN ONLY  LOOK ALIKE SOUND ALIKE MED         1826 [C]            propofol (DIPRIVAN) 200 MG/20ML bolus injection 10 mg  Dose: 10 mg  Freq: Once Route: IV  Start: 06/03/22 1830 End: 06/03/22 1823   Admin Instructions:   IV PUSH BY PHYSICIAN ONLY   LOOK ALIKE SOUND ALIKE MED         4861 [C]              sacubitril-valsartan (ENTRESTO) 24-26 MG per tablet 1 tablet  Dose: 1 tablet  Freq: 2 times daily Route: PO  Start: 06/03/22 2000 End: 06/07/22 1418   Order specific questions:   QUESTION 1: Does patient meet all of the following? NYHA Class II-IV and Ejection Fraction less than or equal to 35% and receiving conventional chronic heart failure treatment  Yes  QUESTION 2  If any of the following, patient is ineligible: Concomitant treatment with ACE Inhibitor or ARB or history of angioedema or unacceptable side effects during use of ACE Inhibitor/ARB or SBP less than 100mmHg  Pt has none - eligible  Hold for systolic blood pressure less than (mmHg) 110         2120      0845     (1705)      (0912)     1011 [C]     1751      0934     (1737)      0839 [C]     1418-D/C'd      senna (SENOKOT) tablet 17 2 mg  Dose: 2 tablet  Freq: Daily Route: PO  Indications of Use: CONSTIPATION  Start: 06/04/22 0900 End: 06/07/22 1418          0845      0912      (0524)      (2272)     1418-D/C'd      torsemide (DEMADEX) tablet 40 mg  Dose: 40 mg  Freq: Daily Route: PO  Start: 06/05/22 0945 End: 06/07/22 1418   Order specific questions:   Hold for systolic blood pressure less than (mmHg) 110           1144      0934      0839     1418-D/C'd        Continuous IV Infusions:    PRN Meds:  HYDROmorphone HCl (DILAUDID) injection 0 2 mg  Dose: 0 2 mg  Freq: Every 3 hours PRN Route: IV  PRN Reason: breakthrough pain  Start: 06/03/22 2230 End: 06/07/22 1418   Admin Instructions:   High alert medication   LOOK ALIKE SOUND ALIKE MED            2012 1418-D/C'd      oxyCODONE (ROXICODONE) IR tablet 2 5 mg  Dose: 2 5 mg  Freq: Every 4 hours PRN Route: PO  PRN Reason: moderate pain  Start: 06/03/22 2229 End: 06/07/22 1418   Admin Instructions:   High alert medication  LOOK ALIKE SOUND ALIKE MED          6161     7999       9650      0518     1418-D/C'd      oxyCODONE (ROXICODONE) IR tablet 5 mg  Dose: 5 mg  Freq: Every 4 hours PRN Route: PO  PRN Reason: severe pain  Start: 06/03/22 2230 End: 06/07/22 1418   Admin Instructions:   High alert medication   LOOK ALIKE SOUND ALIKE MED          3338      2024              IP CONSULT TO GERONTOLOGY  IP CONSULT TO ORTHOPEDIC SURGERY    Network Utilization Review Department  ATTENTION: Please call with any questions or concerns to [phone] and carefully listen to the prompts so that you are directed to the right person  All voicemails are confidential   Rosy Zavala all requests for admission clinical reviews, approved or denied determinations and any other requests to dedicated fax number below belonging to the campus where the patient is receiving treatment   List of dedicated fax numbers for the Facilities:  [address] DENIALS (Administrative/Medical Necessity) [phone]   [address] (Maternity/NICU/Pediatrics) [phone]   [address]  [address] [facility] [place] [address] [address] [place] [address] [address] [facility] [place] [address] [phone]

## 2022-06-10 NOTE — ASSESSMENT & PLAN NOTE
· May 19, 2022: Hemoglobin A1c:  5 9%,   · Will monitor his fasting fingerstick blood sugar   · Will continue with monitoring for change in his condition   · He will follow up with his PCP upon discharge

## 2022-06-10 NOTE — ASSESSMENT & PLAN NOTE
· Suffered after fall from a step onto an asphalt driveway   · In consultation with his orthopedic service, he has elected for nonsurgical treatment at this time  · Will continue with multimodal pain management  · He is nonweightbearing to his right upper extremity  Secondary to his decreased level of functioning from baseline, he will be admitted to the subacute rehabilitation facility and seen in consultation by a multidisciplinary rehabilitation team for evaluation and treatment to assist him in returning to his prior level of functioning  · Will continue his care in collaboration with the orthopedic service   · He will be following up in 1 week with imaging with his orthopedic service   · Will continue with monitoring for change in his condition   · He will follow up with his PCP and orthopedic services upon discharge

## 2022-06-10 NOTE — ASSESSMENT & PLAN NOTE
· He endorses that escitalopram is helpful  · He denies suicidal ideation  · He is following with his spiritual service for support and has reached out to them today  · As discussed with him, escitalopram 10 mg is the maximum daily dosage recommended in the geriatric population  · I will lower the dosage of his escitalopram from 20 mg daily to 10 mg daily and have advised him to notify anyone in his treatment team if he notices a change in his mood  · Will continue with monitoring for change in his condition  · He will follow up with his PCP upon discharge

## 2022-06-10 NOTE — ASSESSMENT & PLAN NOTE
· June 7, 2022: Potassium 3 4  · Will repeat his laboratory value   · Will supplement, as needed  · Will continue with clinical and periodic laboratory monitoring for change in his condition

## 2022-06-10 NOTE — ASSESSMENT & PLAN NOTE
· He notes chronic condition  · He endorses following with a podiatrist as an outpatient   · He requests consultation with the facility Podiatry service to see if he can be fitted for appropriate shoes  · He will follow up with his PCP and Podiatry services upon discharge

## 2022-06-10 NOTE — ASSESSMENT & PLAN NOTE
· Jaz 3, 2022:  ALT 82, AST 60  · He is asymptomatic  · Will repeat laboratory values  · Will continue with monitoring for change in his condition  · He will follow up with his PCP upon discharge

## 2022-06-10 NOTE — ASSESSMENT & PLAN NOTE
· May 19, 2022: Fasting lipid profile:   Total cholesterol 115, triglycerides 110, HDL 36, LDL 57  · Will continue his prior to admission atorvastatin  · Will repeat his liver function panel as his recent testing revealed abnormal liver function tests  · Will continue with clinical and periodic laboratory monitoring for changes condition  · He will follow up with PCP upon discharge

## 2022-06-10 NOTE — ASSESSMENT & PLAN NOTE
· June 7, 2022:  Sodium 131  · Will continue with clinical and periodic laboratory monitoring for change in his condition   · He will follow up with his PCP upon discharge

## 2022-06-10 NOTE — ASSESSMENT & PLAN NOTE
· As verified with his cardiology service's office, I will continue his prior to admission amiodarone, carvedilol, Entresto, and mexiletine  · Will have his potassium and magnesium levels rechecked  · Will target a potassium level greater than 4 and magnesium level greater than 2  · He has an AICD in place   · Will continue his care in collaboration with his cardiology service   · Will continue with monitoring for change in his condition   · He will follow up with his PCP and cardiology services upon discharge

## 2022-06-10 NOTE — ASSESSMENT & PLAN NOTE
· AICD in place   · Will continue his prior to admission torsemide and Entresto  · Will continue with clinical and periodic laboratory monitoring for change in his condition   · He will follow up with his PCP and cardiology services upon discharge

## 2022-06-10 NOTE — ASSESSMENT & PLAN NOTE
· Demonstrated on echocardiogram performed at outside facility on September 23, 2021 (estimated ejection fraction 45% with grade 2 diastolic dysfunction)  · Will continue his prior to admission torsemide and Entresto  · Will continue with clinical and periodic laboratory monitoring for change in his condition   · He will follow up with his PCP and cardiology services upon discharge

## 2022-06-10 NOTE — ASSESSMENT & PLAN NOTE
· Will continue his prior to admission Entresto  · Will continue with clinical and periodic laboratory monitoring for change in his condition   · He will follow up with his PCP upon discharge

## 2022-06-10 NOTE — ASSESSMENT & PLAN NOTE
· June 7, 2022: Creatinine 1 17, EGFR 60  · Will continue with avoidance of nephrotoxic medications and supplements   · Will monitor closely with restarting of torsemide   · Will continue with clinical and periodic laboratory monitoring for changes condition  · He will follow up with his PCP upon discharge

## 2022-06-13 ENCOUNTER — NURSING HOME VISIT (OUTPATIENT)
Dept: GERIATRICS | Facility: OTHER | Age: 77
End: 2022-06-13
Payer: MEDICARE

## 2022-06-13 DIAGNOSIS — I10 HYPERTENSION, UNSPECIFIED TYPE: Chronic | ICD-10-CM

## 2022-06-13 DIAGNOSIS — R26.2 AMBULATORY DYSFUNCTION: Primary | Chronic | ICD-10-CM

## 2022-06-13 DIAGNOSIS — I50.42 CHRONIC COMBINED SYSTOLIC AND DIASTOLIC CONGESTIVE HEART FAILURE (HCC): ICD-10-CM

## 2022-06-13 DIAGNOSIS — S42.321D CLOSED DISPLACED TRANSVERSE FRACTURE OF SHAFT OF RIGHT HUMERUS WITH ROUTINE HEALING, SUBSEQUENT ENCOUNTER: ICD-10-CM

## 2022-06-13 PROCEDURE — 99309 SBSQ NF CARE MODERATE MDM 30: CPT | Performed by: NURSE PRACTITIONER

## 2022-06-14 VITALS
RESPIRATION RATE: 18 BRPM | BODY MASS INDEX: 46 KG/M2 | WEIGHT: 268 LBS | HEART RATE: 84 BPM | TEMPERATURE: 96.9 F | SYSTOLIC BLOOD PRESSURE: 103 MMHG | DIASTOLIC BLOOD PRESSURE: 62 MMHG

## 2022-06-14 NOTE — ASSESSMENT & PLAN NOTE
· Controlled   · Continue with carvedilol, amiodarone, torsemide  · Continue monitor BP  · Monitor for acute changes

## 2022-06-14 NOTE — PROGRESS NOTES
[address]   [phone]  [facility] Progress Note        NAME: Salbador Royal  AGE: 77 y o  SEX: male    DATE OF ENCOUNTER: 6/13/22    Assessment and Plan     1  Ambulatory dysfunction  Assessment & Plan:  At baseline at [facility]  Continue with restorative care  Assist with ADL and IADL  Fall precautions  · Monitor for acute changes      2  Chronic combined systolic and diastolic congestive heart failure (HCC)  Assessment & Plan:  · Asymptomatic   · Continue with torsemide  · Continue to monitor weights daily  · Elevate legs as much as possible   · Monitor for acute changes          3  Closed displaced transverse fracture of shaft of right humerus with routine healing, subsequent encounter  Assessment & Plan:  · Patient states he elected for nonsurgical healing at this time   · At time of visit patient is looking to go to orthopedics and per for surgery for healing   · Continue with PT/OT as tolerated  · Assist with ADL and I ADL   · Continue to wear sling   · Monitor for acute changes      4  Hypertension, unspecified type  Assessment & Plan:  · Controlled   · Continue with carvedilol, amiodarone, torsemide  · Continue monitor BP  · Monitor for acute changes          No orders of the defined types were placed in this encounter  History of Present Illness     Salbador Royal 77 y o  male seen for follow-up of care and treatment plan for ambulatory dysfunction doing well at [facility], CHF asymptomatic, right humerus fx routine healing, HTN controlled with BP meds     The following portions of the patient's history were reviewed and updated as appropriate: allergies, current medications, past family history, past medical history, past social history, past surgical history and problem list     Review of Systems     Review of Systems   Constitutional: Positive for activity change (right arm in sling)  Negative for chills and fever     HENT: Negative for ear pain and sore throat  Eyes: Negative for pain and visual disturbance  Respiratory: Negative for cough, shortness of breath and wheezing  Cardiovascular: Negative for chest pain and palpitations  Gastrointestinal: Negative for abdominal pain and vomiting  Genitourinary: Negative for dysuria and hematuria  Musculoskeletal: Positive for gait problem  Negative for arthralgias and back pain  Skin: Negative for color change and rash  Neurological: Positive for weakness  Negative for seizures and syncope  All other systems reviewed and are negative  Objective     /62   Pulse 84   Temp (!) 96 9 °F (36 1 °C)   Resp 18   Wt 122 kg (268 lb)   BMI 46 00 kg/m²     Physical Exam  Vitals reviewed  Constitutional:       Appearance: He is well-developed  HENT:      Head: Normocephalic and atraumatic  Eyes:      Conjunctiva/sclera: Conjunctivae normal    Cardiovascular:      Rate and Rhythm: Normal rate and regular rhythm  Heart sounds: Normal heart sounds, S1 normal and S2 normal  No murmur heard  Pulmonary:      Effort: Pulmonary effort is normal  No respiratory distress  Breath sounds: Normal breath sounds  No wheezing  Abdominal:      General: Bowel sounds are normal  There is no distension  Palpations: Abdomen is soft  Tenderness: There is no abdominal tenderness  Musculoskeletal:         General: Normal range of motion  Cervical back: Normal range of motion  Comments: Generalized Weakness   Skin:     General: Skin is warm and dry  Neurological:      Mental Status: He is alert  Psychiatric:         Attention and Perception: Attention normal          Mood and Affect: Mood normal          Speech: Speech normal          Behavior: Behavior normal          Thought Content:  Thought content normal          Cognition and Memory: Cognition normal          Pertinent Laboratory/Diagnostic Studies:  HFM Labs Reviewed    Current Medications   Medication list reviewed and updated today  Please see paper chart for updated medication list      Devikayimi Rodriguez  24:42 PM      Name: Mose Hatchet  : 1945  MRN: [id number]  DOS: 22    Diagnoses:   Diagnosis ICD-10-CM Associated Orders   1  Ambulatory dysfunction  R26 2    2  Chronic combined systolic and diastolic congestive heart failure (HCC)  I50 42    3  Closed displaced transverse fracture of shaft of right humerus with routine healing, subsequent encounter  S42 321D    4   Hypertension, unspecified type  I10

## 2022-06-14 NOTE — ASSESSMENT & PLAN NOTE
· Asymptomatic   · Continue with torsemide  · Continue to monitor weights daily  · Elevate legs as much as possible   · Monitor for acute changes

## 2022-06-14 NOTE — ASSESSMENT & PLAN NOTE
· Patient states he elected for nonsurgical healing at this time   · At time of visit patient is looking to go to orthopedics and per for surgery for healing   · Continue with PT/OT as tolerated  · Assist with ADL and I ADL   · Continue to wear sling   · Monitor for acute changes

## 2022-06-14 NOTE — ASSESSMENT & PLAN NOTE
At baseline at [address]  Continue with restorative care  Assist with ADL and IADL  Fall precautions  · Monitor for acute changes

## 2022-06-16 LAB
BASE EXCESS BLDA CALC-SCNC: 7 MMOL/L (ref -2–3)
CA-I BLD-SCNC: 1.09 MMOL/L (ref 1.12–1.32)
GLUCOSE SERPL-MCNC: 85 MG/DL (ref 65–140)
HCO3 BLDA-SCNC: 32.7 MMOL/L (ref 24–30)
HCT VFR BLD CALC: 37 % (ref 36.5–49.3)
HGB BLDA-MCNC: 12.6 G/DL (ref 12–17)
PCO2 BLD: 34 MMOL/L (ref 21–32)
PCO2 BLD: 47.7 MM HG (ref 42–50)
PH BLD: 7.44 [PH] (ref 7.3–7.4)
PO2 BLD: 25 MM HG (ref 35–45)
POTASSIUM BLD-SCNC: 3.9 MMOL/L (ref 3.5–5.3)
SAO2 % BLD FROM PO2: 46 % (ref 60–85)
SODIUM BLD-SCNC: 136 MMOL/L (ref 136–145)
SPECIMEN SOURCE: ABNORMAL

## 2022-06-20 ENCOUNTER — TELEPHONE (OUTPATIENT)
Dept: OBGYN CLINIC | Facility: HOSPITAL | Age: 77
End: 2022-06-20

## 2022-06-20 NOTE — TELEPHONE ENCOUNTER
I spoke with [facility] and they can bring him for 11:30  Please advise if this will be okay with you  I made note in the appt line that he will be coming at this time

## 2022-06-20 NOTE — TELEPHONE ENCOUNTER
Hello,  Please advise if the following patient can be forced onto the schedule:    Patient: Annelise Monge      : 1945    [facility]:[id number]    Call back #: [phone]    Insurance: medicare     Reason for appointment:   Acute fracture in the midshaft of the right humerus  Alignment is markedly improved compared to the earlier images with less displacement and angulation  Requested doctor/location:  Dr Radha Alatorre or Dr Mary Anne Blake       Thank you

## 2022-06-23 ENCOUNTER — NURSING HOME VISIT (OUTPATIENT)
Dept: GERIATRICS | Facility: OTHER | Age: 77
End: 2022-06-23
Payer: MEDICARE

## 2022-06-23 DIAGNOSIS — I10 HYPERTENSION, UNSPECIFIED TYPE: Chronic | ICD-10-CM

## 2022-06-23 DIAGNOSIS — S42.321D CLOSED DISPLACED TRANSVERSE FRACTURE OF SHAFT OF RIGHT HUMERUS WITH ROUTINE HEALING, SUBSEQUENT ENCOUNTER: Primary | ICD-10-CM

## 2022-06-23 DIAGNOSIS — I50.42 CHRONIC COMBINED SYSTOLIC AND DIASTOLIC CONGESTIVE HEART FAILURE (HCC): ICD-10-CM

## 2022-06-23 PROCEDURE — 99309 SBSQ NF CARE MODERATE MDM 30: CPT | Performed by: NURSE PRACTITIONER

## 2022-06-27 ENCOUNTER — NURSING HOME VISIT (OUTPATIENT)
Dept: GERIATRICS | Facility: OTHER | Age: 77
End: 2022-06-27
Payer: MEDICARE

## 2022-06-27 DIAGNOSIS — I10 HYPERTENSION, UNSPECIFIED TYPE: Chronic | ICD-10-CM

## 2022-06-27 DIAGNOSIS — I47.2 VENTRICULAR TACHYCARDIA (PAROXYSMAL) (HCC): ICD-10-CM

## 2022-06-27 DIAGNOSIS — N18.31 STAGE 3A CHRONIC KIDNEY DISEASE (HCC): Chronic | ICD-10-CM

## 2022-06-27 DIAGNOSIS — R74.8 ABNORMAL LIVER ENZYMES: Primary | ICD-10-CM

## 2022-06-27 DIAGNOSIS — I42.9 CARDIOMYOPATHY, UNSPECIFIED TYPE (HCC): ICD-10-CM

## 2022-06-27 DIAGNOSIS — I50.42 CHRONIC COMBINED SYSTOLIC AND DIASTOLIC CONGESTIVE HEART FAILURE (HCC): ICD-10-CM

## 2022-06-27 DIAGNOSIS — Z95.810 AICD (AUTOMATIC CARDIOVERTER/DEFIBRILLATOR) PRESENT: ICD-10-CM

## 2022-06-27 DIAGNOSIS — Z01.818 PRE-OPERATIVE CLEARANCE: ICD-10-CM

## 2022-06-27 DIAGNOSIS — S42.321D CLOSED DISPLACED TRANSVERSE FRACTURE OF SHAFT OF RIGHT HUMERUS WITH ROUTINE HEALING, SUBSEQUENT ENCOUNTER: ICD-10-CM

## 2022-06-27 PROCEDURE — 99310 SBSQ NF CARE HIGH MDM 45: CPT | Performed by: NURSE PRACTITIONER

## 2022-06-28 VITALS — HEART RATE: 60 BPM | TEMPERATURE: 97.8 F | DIASTOLIC BLOOD PRESSURE: 58 MMHG | SYSTOLIC BLOOD PRESSURE: 108 MMHG

## 2022-06-28 VITALS
WEIGHT: 268 LBS | TEMPERATURE: 97 F | DIASTOLIC BLOOD PRESSURE: 60 MMHG | HEART RATE: 63 BPM | SYSTOLIC BLOOD PRESSURE: 110 MMHG | BODY MASS INDEX: 46 KG/M2 | RESPIRATION RATE: 18 BRPM

## 2022-06-28 PROBLEM — Z01.818 PRE-OPERATIVE CLEARANCE: Status: ACTIVE | Noted: 2022-06-28

## 2022-06-28 NOTE — ASSESSMENT & PLAN NOTE
· Doing well  · No edema present at this time   · continue with torsemide   · monitor for acute changes   · monitor weights

## 2022-06-28 NOTE — ASSESSMENT & PLAN NOTE
· Doing well  · AICD in place  · continue with torsemide and contrast 0   · Monitor for any acute changes

## 2022-06-28 NOTE — ASSESSMENT & PLAN NOTE
· Routine healing at this time   · recent orthopedic visit, patient agreeing to elective surgery  · surgery scheduled for 06/29/2022   · Patient is medically stable for surgical procedure

## 2022-06-28 NOTE — ASSESSMENT & PLAN NOTE
· Patient medically stable at this time   · Medically stable to have procedure done to correct humerus fracture  · Patient will need cardiac clearance as well

## 2022-06-28 NOTE — PROGRESS NOTES
[place] [address]  [phone]  [facility]  Short-term rehab progress Note        NAME: Dayan Escobar  AGE: 77 y o  SEX: male    DATE OF ENCOUNTER:  06/23/2022    Assessment and Plan     1  Closed displaced transverse fracture of shaft of right humerus with routine healing, subsequent encounter  Assessment & Plan:  · Martha healing   · Patient to undergo surgery on 06/29/2022   · Continue at this time current therapy  · Swelling on the right arm  · monitor for acute changes      2  Chronic combined systolic and diastolic congestive heart failure (HCC)  Assessment & Plan:  · Asymptomatic at this time   · Continue to monitor weights   · Continue with torsemide   · Monitor for acute changes        3  Hypertension, unspecified type  Assessment & Plan:  · Controlled   · Continue with carvedilol, amiodarone, torsemide  · monitor BP   · monitor for acute changes          No orders of the defined types were placed in this encounter  History of Present Illness     Dayan Escobar 77 y o  male seen for follow-up of care and treatment plan for ambulatory dysfunction doing well at short-term rehab, right humerus fracture with routine healing, CHF asymptomatic, hypertension control and BP meds    The following portions of the patient's history were reviewed and updated as appropriate: allergies, current medications, past family history, past medical history, past social history, past surgical history and problem list     Review of Systems     Review of Systems   Constitutional: Negative for chills and fever  HENT: Negative for ear pain and sore throat  Eyes: Negative for pain and visual disturbance  Respiratory: Negative for cough and shortness of breath  Cardiovascular: Negative for chest pain and palpitations  Gastrointestinal: Negative for abdominal pain and vomiting  Genitourinary: Negative for dysuria and hematuria     Musculoskeletal: Positive for gait problem  Negative for arthralgias and back pain  Skin: Negative for color change and rash  Neurological: Positive for weakness  Negative for seizures and syncope  All other systems reviewed and are negative  Objective     /58   Pulse 60   Temp 97 8 °F (36 6 °C)     Physical Exam  Vitals reviewed  Constitutional:       Appearance: He is well-developed  HENT:      Head: Normocephalic and atraumatic  Eyes:      Conjunctiva/sclera: Conjunctivae normal    Cardiovascular:      Rate and Rhythm: Normal rate and regular rhythm  Heart sounds: Normal heart sounds, S1 normal and S2 normal  No murmur heard  Pulmonary:      Effort: Pulmonary effort is normal  No respiratory distress  Breath sounds: Normal breath sounds  No wheezing  Abdominal:      General: Bowel sounds are normal  There is no distension  Palpations: Abdomen is soft  Tenderness: There is no abdominal tenderness  Musculoskeletal:         General: Normal range of motion  Cervical back: Normal range of motion  Comments: Right humerus fracture   Skin:     General: Skin is warm and dry  Neurological:      Mental Status: He is alert  Psychiatric:         Attention and Perception: Attention normal          Mood and Affect: Mood normal          Speech: Speech normal          Behavior: Behavior normal          Thought Content: Thought content normal          Cognition and Memory: Cognition normal          Pertinent Laboratory/Diagnostic Studies:  HFM Labs Reviewed    Current Medications   Medication list reviewed and updated today  Please see paper chart for updated medication list      Wilhemina Delay  28:29 AM      Name: Ashli Dulce  : 1945  MRN: [id number]  DOS:  2022    Diagnoses:   Diagnosis ICD-10-CM Associated Orders   1  Closed displaced transverse fracture of shaft of right humerus with routine healing, subsequent encounter  S42 321D    2   Chronic combined systolic and diastolic congestive heart failure (HCC)  I50 42    3   Hypertension, unspecified type  I10

## 2022-06-28 NOTE — ASSESSMENT & PLAN NOTE
· Controlled   · Continue with carvedilol, amiodarone, torsemide  · monitor BP   · monitor for acute changes

## 2022-06-28 NOTE — ASSESSMENT & PLAN NOTE
· Asymptomatic at this time   · Continue to monitor weights   · Continue with torsemide   · Monitor for acute changes

## 2022-06-28 NOTE — ASSESSMENT & PLAN NOTE
· Times at baseline  · continue to avoid nephrotoxins as much as possible  · monitor BMP for renal function

## 2022-06-28 NOTE — ASSESSMENT & PLAN NOTE
· Control at this time   · continue with current medications  · follow up with Cardiology as outpatient as needed

## 2022-06-28 NOTE — ASSESSMENT & PLAN NOTE
· Martha diane   · Patient to undergo surgery on 06/29/2022   · Continue at this time current therapy  · Swelling on the right arm  · monitor for acute changes

## 2022-06-28 NOTE — PROGRESS NOTES
[facility]  [address] [address] [facility] [address]   [phone]  [facility]  Short-term rehab progress Note        NAME: Salbador Royal  AGE: 77 y o  SEX: male    DATE OF ENCOUNTER:  06/27/2022    Assessment and Plan     1  Abnormal liver enzymes  Assessment & Plan:  · Labs with slight improvement  · AST 60, ALT 75 on 06/10/2022  · Continue to periodically monitor labs  · monitor for acute changes      2  AICD (automatic cardioverter/defibrillator) present  Assessment & Plan:  · Doing well  · Patient has home while monitor  · monitor for acute changes      3  Cardiomyopathy, unspecified type [facility])  Assessment & Plan:  · Doing well  · AICD in place  · continue with torsemide and contrast 0   · Monitor for any acute changes      4  Chronic combined systolic and diastolic congestive heart failure (HCC)  Assessment & Plan:  · Doing well  · No edema present at this time   · continue with torsemide   · monitor for acute changes   · monitor weights          5  Stage 3a chronic kidney disease ([address] )  Assessment & Plan:  · Times at baseline  · continue to avoid nephrotoxins as much as possible  · monitor BMP for renal function      6  Closed displaced transverse fracture of shaft of right humerus with routine healing, subsequent encounter  Assessment & Plan:  · Routine healing at this time   · recent orthopedic visit, patient agreeing to elective surgery  · surgery scheduled for 06/29/2022   · Patient is medically stable for surgical procedure      7  Ventricular tachycardia (paroxysmal) (HCC)  Assessment & Plan:  · Control at this time   · continue with current medications  · follow up with Cardiology as outpatient as needed      8  Hypertension, unspecified type  Assessment & Plan:  · Doing well  · Continue with carvedilol, amiodarone, torsemide  · continue to monitor BP      9   Pre-operative clearance  Assessment & Plan:  · Patient medically stable at this time   · Medically stable to have procedure done to correct humerus fracture  · Patient will need cardiac clearance as well         No orders of the defined types were placed in this encounter  History of Present Illness     Dudley Angel 77 y o  male seen for follow-up of care and treatment plan for ambulatory dysfunction doing well at short-term rehab, abnormal liver enzymes labs returning to baseline, cardiomyopathy asymptomatic, CHF doing well with torsemide, CKD 3 levels of baseline, right humerus fracture the patient opting for elective surgery, ventricular tachycardia controlled    The following portions of the patient's history were reviewed and updated as appropriate: allergies, current medications, past family history, past medical history, past social history, past surgical history and problem list     Review of Systems     Review of Systems   Constitutional: Negative for chills and fever  HENT: Negative for ear pain and sore throat  Eyes: Negative for pain and visual disturbance  Respiratory: Negative for cough and shortness of breath  Cardiovascular: Negative for chest pain and palpitations  Gastrointestinal: Negative for abdominal pain and vomiting  Genitourinary: Negative for dysuria and hematuria  Musculoskeletal: Positive for gait problem  Negative for arthralgias and back pain  Skin: Negative for color change and rash  Neurological: Positive for weakness  Negative for seizures and syncope  All other systems reviewed and are negative  Objective     /60   Pulse 63   Temp (!) 97 °F (36 1 °C)   Resp 18   Wt 122 kg (268 lb)   BMI 46 00 kg/m²     Physical Exam  Vitals reviewed  Constitutional:       Appearance: He is well-developed  HENT:      Head: Normocephalic and atraumatic  Eyes:      Conjunctiva/sclera: Conjunctivae normal    Cardiovascular:      Rate and Rhythm: Normal rate and regular rhythm  Heart sounds: Normal heart sounds, S1 normal and S2 normal  No murmur heard    Pulmonary:      Effort: Pulmonary effort is normal  No respiratory distress  Breath sounds: Normal breath sounds  No wheezing  Abdominal:      General: Bowel sounds are normal  There is no distension  Palpations: Abdomen is soft  Tenderness: There is no abdominal tenderness  Musculoskeletal:         General: Normal range of motion  Cervical back: Normal range of motion  Comments: Generalized weakness   Skin:     General: Skin is warm and dry  Neurological:      Mental Status: He is alert  Psychiatric:         Attention and Perception: Attention normal          Mood and Affect: Mood normal          Speech: Speech normal          Behavior: Behavior normal          Thought Content: Thought content normal          Cognition and Memory: Cognition normal          Pertinent Laboratory/Diagnostic Studies:  HFM Labs Reviewed    Current Medications   Medication list reviewed and updated today  Please see paper chart for updated medication list      Kari Bryant  28:49 AM      Name: Erika Gibbs  : 1945  MRN: [id number]  DOS:  2022    Diagnoses:   Diagnosis ICD-10-CM Associated Orders   1  Abnormal liver enzymes  R74 8    2  AICD (automatic cardioverter/defibrillator) present  Z95 810    3  Cardiomyopathy, unspecified type ([address] )  I42 9    4  Chronic combined systolic and diastolic congestive heart failure (HCC)  I50 42    5  Stage 3a chronic kidney disease (HCC)  N18 31    6  Closed displaced transverse fracture of shaft of right humerus with routine healing, subsequent encounter  S42 321D    7  Ventricular tachycardia (paroxysmal) (HCC)  I47 2    8  Hypertension, unspecified type  I10    9   Pre-operative clearance  Z01 818

## 2022-06-28 NOTE — ASSESSMENT & PLAN NOTE
· Labs with slight improvement  · AST 60, ALT 75 on 06/10/2022  · Continue to periodically monitor labs  · monitor for acute changes

## 2022-10-06 ENCOUNTER — APPOINTMENT (EMERGENCY)
Dept: CT IMAGING | Facility: HOSPITAL | Age: 77
End: 2022-10-06
Payer: MEDICARE

## 2022-10-06 ENCOUNTER — APPOINTMENT (OUTPATIENT)
Dept: RADIOLOGY | Facility: HOSPITAL | Age: 77
End: 2022-10-06
Payer: MEDICARE

## 2022-10-06 ENCOUNTER — HOSPITAL ENCOUNTER (EMERGENCY)
Facility: HOSPITAL | Age: 77
Discharge: HOME | End: 2022-10-06
Attending: EMERGENCY MEDICINE
Payer: MEDICARE

## 2022-10-06 VITALS
BODY MASS INDEX: 44.27 KG/M2 | TEMPERATURE: 98.2 F | WEIGHT: 257.94 LBS | HEART RATE: 59 BPM | DIASTOLIC BLOOD PRESSURE: 69 MMHG | RESPIRATION RATE: 18 BRPM | SYSTOLIC BLOOD PRESSURE: 111 MMHG | OXYGEN SATURATION: 94 %

## 2022-10-06 DIAGNOSIS — S42.352A CLOSED DISPLACED COMMINUTED FRACTURE OF SHAFT OF LEFT HUMERUS, INITIAL ENCOUNTER: ICD-10-CM

## 2022-10-06 DIAGNOSIS — S42.002A CLOSED DISPLACED FRACTURE OF LEFT CLAVICLE: Primary | ICD-10-CM

## 2022-10-06 LAB
ANION GAP SERPL CALCULATED.3IONS-SCNC: 6 MMOL/L (ref 4–13)
APTT PPP: 33 SECONDS (ref 23–37)
BASOPHILS # BLD AUTO: 0.03 THOUSANDS/ΜL (ref 0–0.1)
BASOPHILS NFR BLD AUTO: 0 % (ref 0–1)
BUN SERPL-MCNC: 23 MG/DL (ref 5–25)
CALCIUM SERPL-MCNC: 8.8 MG/DL (ref 8.4–10.2)
CHLORIDE SERPL-SCNC: 99 MMOL/L (ref 96–108)
CO2 SERPL-SCNC: 33 MMOL/L (ref 21–32)
CREAT SERPL-MCNC: 1.3 MG/DL (ref 0.6–1.3)
EOSINOPHIL # BLD AUTO: 0.03 THOUSAND/ΜL (ref 0–0.61)
EOSINOPHIL NFR BLD AUTO: 0 % (ref 0–6)
ERYTHROCYTE [DISTWIDTH] IN BLOOD BY AUTOMATED COUNT: 14.6 % (ref 11.6–15.1)
GFR SERPL CREATININE-BSD FRML MDRD: 53 ML/MIN/1.73SQ M
GLUCOSE SERPL-MCNC: 98 MG/DL (ref 65–140)
HCT VFR BLD AUTO: 41.2 % (ref 36.5–49.3)
HGB BLD-MCNC: 13.3 G/DL (ref 12–17)
IMM GRANULOCYTES # BLD AUTO: 0.05 THOUSAND/UL (ref 0–0.2)
IMM GRANULOCYTES NFR BLD AUTO: 1 % (ref 0–2)
INR PPP: 1.12 (ref 0.84–1.19)
LYMPHOCYTES # BLD AUTO: 1.01 THOUSANDS/ΜL (ref 0.6–4.47)
LYMPHOCYTES NFR BLD AUTO: 13 % (ref 14–44)
MCH RBC QN AUTO: 31.1 PG (ref 26.8–34.3)
MCHC RBC AUTO-ENTMCNC: 32.3 G/DL (ref 31.4–37.4)
MCV RBC AUTO: 97 FL (ref 82–98)
MONOCYTES # BLD AUTO: 0.87 THOUSAND/ΜL (ref 0.17–1.22)
MONOCYTES NFR BLD AUTO: 11 % (ref 4–12)
NEUTROPHILS # BLD AUTO: 5.71 THOUSANDS/ΜL (ref 1.85–7.62)
NEUTS SEG NFR BLD AUTO: 75 % (ref 43–75)
NRBC BLD AUTO-RTO: 0 /100 WBCS
PLATELET # BLD AUTO: 180 THOUSANDS/UL (ref 149–390)
PMV BLD AUTO: 10.6 FL (ref 8.9–12.7)
POTASSIUM SERPL-SCNC: 3.1 MMOL/L (ref 3.5–5.3)
PROTHROMBIN TIME: 14.6 SECONDS (ref 11.6–14.5)
RBC # BLD AUTO: 4.27 MILLION/UL (ref 3.88–5.62)
SODIUM SERPL-SCNC: 138 MMOL/L (ref 135–147)
WBC # BLD AUTO: 7.7 THOUSAND/UL (ref 4.31–10.16)

## 2022-10-06 PROCEDURE — 80048 BASIC METABOLIC PNL TOTAL CA: CPT | Performed by: EMERGENCY MEDICINE

## 2022-10-06 PROCEDURE — 70450 CT HEAD/BRAIN W/O DYE: CPT

## 2022-10-06 PROCEDURE — 99284 EMERGENCY DEPT VISIT MOD MDM: CPT

## 2022-10-06 PROCEDURE — 85025 COMPLETE CBC W/AUTO DIFF WBC: CPT | Performed by: EMERGENCY MEDICINE

## 2022-10-06 PROCEDURE — 85730 THROMBOPLASTIN TIME PARTIAL: CPT | Performed by: EMERGENCY MEDICINE

## 2022-10-06 PROCEDURE — 73000 X-RAY EXAM OF COLLAR BONE: CPT

## 2022-10-06 PROCEDURE — 72125 CT NECK SPINE W/O DYE: CPT

## 2022-10-06 PROCEDURE — 36415 COLL VENOUS BLD VENIPUNCTURE: CPT | Performed by: EMERGENCY MEDICINE

## 2022-10-06 PROCEDURE — 99284 EMERGENCY DEPT VISIT MOD MDM: CPT | Performed by: EMERGENCY MEDICINE

## 2022-10-06 PROCEDURE — 85610 PROTHROMBIN TIME: CPT | Performed by: EMERGENCY MEDICINE

## 2022-10-06 PROCEDURE — 96374 THER/PROPH/DIAG INJ IV PUSH: CPT

## 2022-10-06 PROCEDURE — 71250 CT THORAX DX C-: CPT

## 2022-10-06 PROCEDURE — 74176 CT ABD & PELVIS W/O CONTRAST: CPT

## 2022-10-06 PROCEDURE — 71045 X-RAY EXAM CHEST 1 VIEW: CPT

## 2022-10-06 PROCEDURE — G1004 CDSM NDSC: HCPCS

## 2022-10-06 RX ORDER — OXYCODONE HYDROCHLORIDE 5 MG/1
TABLET ORAL
Qty: 12 TABLET | Refills: 0 | Status: SHIPPED | OUTPATIENT
Start: 2022-10-06 | End: 2022-10-06 | Stop reason: SDUPTHER

## 2022-10-06 RX ORDER — ACETAMINOPHEN 325 MG/1
975 TABLET ORAL ONCE
Status: COMPLETED | OUTPATIENT
Start: 2022-10-06 | End: 2022-10-06

## 2022-10-06 RX ORDER — OXYCODONE HYDROCHLORIDE 5 MG/1
TABLET ORAL
Qty: 12 TABLET | Refills: 0 | Status: SHIPPED | OUTPATIENT
Start: 2022-10-06

## 2022-10-06 RX ADMIN — MORPHINE SULFATE 2 MG: 2 INJECTION, SOLUTION INTRAMUSCULAR; INTRAVENOUS at 11:17

## 2022-10-06 RX ADMIN — ACETAMINOPHEN 975 MG: 325 TABLET ORAL at 11:17

## 2022-10-06 NOTE — ED PROVIDER NOTES
Emergency Department Trauma Note  Rachel Mcfarland 77 y o  male MRN: [id number]  Unit/Bed#: ED-35/ED-35 Encounter: [id number]      Trauma Alert: Trauma Acuity: CC  Model of Arrival:   via    Trauma Team: Current Providers  Attending Provider: Misty Mensah DO  Registered Nurse: Kulwinder Ivory RN  Consultants:     None      History of Present Illness     Chief Complaint:   Chief Complaint   Patient presents with    Fall     Pt presents to the ED s/p fall x 1 day  Reports trip and fall from standing while working on steps for PT for lower legs  Reports head injury and injury to left upper chest  Denies LOC  Takes asa     HPI:  Rachel Mcfarland is a 77 y o  male who presents with fall from standing, fell backwards and onto left shoulder last night  Lives at [facility], [place]  Takes aspirin daily, he did hit his head without loss of consciousness  C/o pain and swelling to the left clavicle, GCS 15 per EMS  They report BP of 80s/40s prior to arrival, but in the ED BP is 132/68  Mechanism:Details of Incident: Trip and fall from standing while doing PT for legs Injury Date: 10/05/22 Injury Time: 1200      HPI  Review of Systems   All other systems reviewed and are negative        Historical Information     Immunizations:   Immunization History   Administered Date(s) Administered    COVID-19 MODERNA VACC 0 5 ML IM 03/12/2021, 04/09/2021, 12/20/2021       Past Medical History:   Diagnosis Date    Bilateral hearing loss     CHF (congestive heart failure) (HCC)     Depression     Frequent falls     Hypertension     ICD (implantable cardioverter-defibrillator) in place     Ischemic cardiomyopathy     Morbid obesity ([address] )     Sleep apnea     Ventricular tachycardia ([facility] [address] )        Family History   Problem Relation Age of Onset    Heart attack Father      Past Surgical History:   Procedure Laterality Date    CARDIAC ELECTROPHYSIOLOGY STUDY AND ABLATION      Ventricular tachycardia ablation at [address] by   Luhngeli    UMBILICAL HERNIA REPAIR      VASECTOMY       Social History     Tobacco Use    Smoking status: Never Smoker    Smokeless tobacco: Never Used   Vaping Use    Vaping Use: Never used   Substance Use Topics    Alcohol use: Not Currently    Drug use: Not Currently     E-Cigarette/Vaping    E-Cigarette Use Never User      E-Cigarette/Vaping Substances       Family History:   Family History   Problem Relation Age of Onset    Heart attack Father        Meds/Allergies   Prior to Admission Medications   Prescriptions Last Dose Informant Patient Reported? Taking? Efinaconazole (Jublia) 10 % SOLN   Yes No   Sig: Apply 1 application topically in the morning   acetaminophen (TYLENOL) 325 mg tablet   No No   Sig: Take 3 tablets (975 mg total) by mouth every 8 (eight) hours   amiodarone 200 mg tablet   Yes No   Sig: Take 200 mg by mouth every 12 (twelve) hours    atorvastatin (LIPITOR) 20 mg tablet   Yes No   Sig: Take 20 mg by mouth daily with dinner   carvedilol (COREG) 12 5 mg tablet   Yes No   Sig: Take 18 75 mg by mouth every 12 (twelve) hours    docusate sodium (COLACE) 100 mg capsule   No No   Sig: Take 1 capsule (100 mg total) by mouth 2 (two) times a day   escitalopram (LEXAPRO) 10 mg tablet   Yes No   Sig: Take 10 mg by mouth daily   gabapentin (NEURONTIN) 100 mg capsule   No No   Sig: Take 1 capsule (100 mg total) by mouth 3 (three) times a day   methocarbamol (ROBAXIN) 500 mg tablet   No No   Sig: Take 0 5 tablets (250 mg total) by mouth every 6 (six) hours   mexiletine (MEXITIL) 150 mg capsule   Yes No   Sig: Take 150 mg by mouth every 8 (eight) hours    oxyCODONE (ROXICODONE) 5 immediate release tablet   No No   Si 5 mg to 5 mg PO every 4 hours as needed for moderate to severe pain  Ongoing therapy  oxyCODONE (ROXICODONE) 5 immediate release tablet   No Yes   Si 5 mg to 5 mg PO every 4 hours as needed for moderate to severe pain  Ongoing therapy     sacubitril-valsartan Olivia Haskins 24-26 MG TABS   Yes No   Sig: Take 1 tablet by mouth every 12 (twelve) hours    senna (SENOKOT) 8 6 MG tablet   Yes No   Sig: Take 2 tablets by mouth daily   torsemide (DEMADEX) 20 mg tablet   Yes No   Sig: Take 40 mg by mouth 2 (two) times a day      Facility-Administered Medications: None       No Known Allergies    PHYSICAL EXAM    PE limited by: none    Objective   Vitals:   First set: Temperature: 98 2 °F (36 8 °C) (10/06/22 1020)  Pulse: 79 (10/06/22 1020)  Respirations: 16 (10/06/22 1020)  Blood Pressure: 132/68 (10/06/22 1020)  SpO2: 97 % (10/06/22 1020)    Primary Survey:   (A) Airway: intact  (B) Breathing: cta b/l  (C) Circulation: Pulses:   normal  (D) Disabliity:  GCS Total:  15  (E) Expose:  Completed    Secondary Survey: (Click on Physical Exam tab above)  Physical Exam  Vitals and nursing note reviewed  Constitutional:       General: He is not in acute distress  Appearance: Normal appearance  He is obese  He is not ill-appearing  HENT:      Head: Normocephalic and atraumatic  Right Ear: External ear normal       Left Ear: External ear normal       Nose: Nose normal  No congestion or rhinorrhea  Mouth/Throat:      Mouth: Mucous membranes are moist       Pharynx: Oropharynx is clear  Eyes:      General: No scleral icterus  Right eye: No discharge  Left eye: No discharge  Extraocular Movements: Extraocular movements intact  Conjunctiva/sclera: Conjunctivae normal       Pupils: Pupils are equal, round, and reactive to light  Cardiovascular:      Rate and Rhythm: Normal rate and regular rhythm  Pulses: Normal pulses  Heart sounds: Normal heart sounds  Pulmonary:      Effort: Pulmonary effort is normal  No respiratory distress  Breath sounds: Normal breath sounds  Abdominal:      General: Abdomen is flat  Palpations: Abdomen is soft  Tenderness: There is no abdominal tenderness  Musculoskeletal:         General: No swelling  Normal range of motion  Cervical back: Normal range of motion and neck supple  No rigidity or tenderness  Comments: Tenderness to L medial clavicle with ecchymosis, hematoma in the region and to the upper L chest    Skin:     General: Skin is warm and dry  Capillary Refill: Capillary refill takes less than 2 seconds  Neurological:      General: No focal deficit present  Mental Status: He is alert and oriented to person, place, and time  Mental status is at baseline  Psychiatric:         Mood and Affect: Mood normal          Behavior: Behavior normal          Cervical spine cleared by clinical criteria?  No (imaging required)      Invasive Devices  Report    None                 Lab Results:   Results Reviewed     Procedure Component Value Units Date/Time    Protime-INR [[id number]]  (Abnormal) Collected: 10/06/22 1036    Lab Status: Final result Specimen: Blood from Arm, Right Updated: 10/06/22 1108     Protime 14 6 seconds      INR 1 12    APTT [[id number]]  (Normal) Collected: 10/06/22 1036    Lab Status: Final result Specimen: Blood from Arm, Right Updated: 10/06/22 1108     PTT 33 seconds     Basic metabolic panel [[id number]]  (Abnormal) Collected: 10/06/22 1036    Lab Status: Final result Specimen: Blood from Arm, Right Updated: 10/06/22 1058     Sodium 138 mmol/L      Potassium 3 1 mmol/L      Chloride 99 mmol/L      CO2 33 mmol/L      ANION GAP 6 mmol/L      BUN 23 mg/dL      Creatinine 1 30 mg/dL      Glucose 98 mg/dL      Calcium 8 8 mg/dL      eGFR 53 ml/min/1 73sq m     Narrative:      [place] guidelines for Chronic Kidney Disease (CKD):     Stage 1 with normal or high GFR (GFR > 90 mL/min/1 73 square meters)    Stage 2 Mild CKD (GFR = 60-89 mL/min/1 73 square meters)    Stage 3A Moderate CKD (GFR = 45-59 mL/min/1 73 square meters)    Stage 3B Moderate CKD (GFR = 30-44 mL/min/1 73 square meters)    Stage 4 Severe CKD (GFR = 15-29 mL/min/1 73 square meters)    Stage 5 End Stage CKD (GFR <15 mL/min/1 73 square meters)  Note: GFR calculation is accurate only with a steady state creatinine    CBC and differential [[id number]]  (Abnormal) Collected: 10/06/22 1036    Lab Status: Final result Specimen: Blood from Arm, Right Updated: 10/06/22 1053     WBC 7 70 Thousand/uL      RBC 4 27 Million/uL      Hemoglobin 13 3 g/dL      Hematocrit 41 2 %      MCV 97 fL      MCH 31 1 pg      MCHC 32 3 g/dL      RDW 14 6 %      MPV 10 6 fL      Platelets 805 Thousands/uL      nRBC 0 /100 WBCs      Neutrophils Relative 75 %      Immat GRANS % 1 %      Lymphocytes Relative 13 %      Monocytes Relative 11 %      Eosinophils Relative 0 %      Basophils Relative 0 %      Neutrophils Absolute 5 71 Thousands/µL      Immature Grans Absolute 0 05 Thousand/uL      Lymphocytes Absolute 1 01 Thousands/µL      Monocytes Absolute 0 87 Thousand/µL      Eosinophils Absolute 0 03 Thousand/µL      Basophils Absolute 0 03 Thousands/µL                  Imaging Studies:   Direct to CT: Yes  CT head without contrast   Final Result by Sameer Farmer MD (10/06 1120)      No acute intracranial abnormality  Workstation performed: [id number]         CT cervical spine without contrast   Final Result by Sameer Farmer MD (10/06 1120)      No cervical spine fracture or traumatic malalignment  Workstation performed: [id number]         CT chest abdomen pelvis wo contrast   Final Result by Sameer Farmer MD (10/06 1134)   1  Mildly displaced medial left clavicular fracture  No further evidence of acute posttraumatic injury  2   Cholelithiasis                          Workstation performed: [id number]         XR clavicle LEFT   Final Result by Vin Gonzalez MD (10/06 1308)      Medial left clavicle displaced fracture            Workstation performed: [id number]         XR chest 1 view portable   Final Result by Vin Gonzalez MD (10/06 1303)      No acute cardiopulmonary disease  Workstation performed: [id number]               Procedures  Procedures         ED Course  ED Course as of 10/08/22 1238   Thu Oct 06, 2022   1234 Pt did well with road test using lee  L medial clavicle fracture  D/w ortho Rubina and Dr Miya Ortega reviewed films, stable for d/c and outpt f/u  MDM  Number of Diagnoses or Management Options  Closed displaced fracture of left clavicle: new and requires workup     Amount and/or Complexity of Data Reviewed  Clinical lab tests: ordered and reviewed  Tests in the radiology section of CPT®: ordered and reviewed  Decide to obtain previous medical records or to obtain history from someone other than the patient: yes  Independent visualization of images, tracings, or specimens: yes    Risk of Complications, Morbidity, and/or Mortality  Presenting problems: moderate  Diagnostic procedures: moderate  Management options: moderate    Patient Progress  Patient progress: stable          Disposition  [facility] Transfer: No  Final diagnoses:   Closed displaced fracture of left clavicle     Time reflects when diagnosis was documented in both MDM as applicable and the Disposition within this note     Time User Action Codes Description Comment    10/6/2022 12:35 PM Reggie Wellington Add [S42 [id number]] Closed displaced fracture of left clavicle     10/6/2022 12:36 PM Yohana Saravia Add [[id number]] Closed displaced comminuted fracture of shaft of left humerus, initial encounter       ED Disposition     ED Disposition   Discharge    Condition   Stable    Date/Time   Thu Oct 6, 2022 12:34 PM    Comment   Mittie Aid discharge to home/self care  Follow-up Information     Follow up With Specialties Details Why Contact Info Additional [address] [facility],  Internal Medicine   [facility]   [place]  [address] [facility] [address] [facility] [place], [address] [facility] [address] [place], [place], [facility]        Discharge Medication List as of 10/6/2022 12:38 PM      CONTINUE these medications which have CHANGED    Details   oxyCODONE (ROXICODONE) 5 immediate release tablet 2 5 mg to 5 mg PO every 4 hours as needed for moderate to severe pain   Ongoing therapy , Normal         CONTINUE these medications which have NOT CHANGED    Details   acetaminophen (TYLENOL) 325 mg tablet Take 3 tablets (975 mg total) by mouth every 8 (eight) hours, Starting Tue 6/7/2022, No Print      amiodarone 200 mg tablet Take 200 mg by mouth every 12 (twelve) hours , Historical Med      atorvastatin (LIPITOR) 20 mg tablet Take 20 mg by mouth daily with dinner, Historical Med      carvedilol (COREG) 12 5 mg tablet Take 18 75 mg by mouth every 12 (twelve) hours , Historical Med      docusate sodium (COLACE) 100 mg capsule Take 1 capsule (100 mg total) by mouth 2 (two) times a day, Starting Tue 6/7/2022, No Print      Efinaconazole (Jublia) 10 % SOLN Apply 1 application topically in the morning, Historical Med      escitalopram (LEXAPRO) 10 mg tablet Take 10 mg by mouth daily, Historical Med      gabapentin (NEURONTIN) 100 mg capsule Take 1 capsule (100 mg total) by mouth 3 (three) times a day, Starting Tue 6/7/2022, No Print      methocarbamol (ROBAXIN) 500 mg tablet Take 0 5 tablets (250 mg total) by mouth every 6 (six) hours, Starting Tue 6/7/2022, No Print      mexiletine (MEXITIL) 150 mg capsule Take 150 mg by mouth every 8 (eight) hours , Historical Med      sacubitril-valsartan (Entresto) 24-26 MG TABS Take 1 tablet by mouth every 12 (twelve) hours , Historical Med      senna (SENOKOT) 8 6 MG tablet Take 2 tablets by mouth daily, Historical Med      torsemide (DEMADEX) 20 mg tablet Take 40 mg by mouth 2 (two) times a day, Historical Med               PDMP Review       Value Time User    PDMP Reviewed  Yes 6/7/2022 10:17 AM Pura Betts PA-C          ED Provider  Electronically Signed by         Lenka Rodriguez DO  10/08/22 1235

## 2022-10-06 NOTE — ED NOTES
Telephone call to SUR[facility] Rm, message left with call back information for report   A/w phone call     Edil Villagomez RN  10/06/22 4847
